# Patient Record
Sex: FEMALE | Race: WHITE | NOT HISPANIC OR LATINO
[De-identification: names, ages, dates, MRNs, and addresses within clinical notes are randomized per-mention and may not be internally consistent; named-entity substitution may affect disease eponyms.]

---

## 2021-04-13 PROBLEM — Z00.00 ENCOUNTER FOR PREVENTIVE HEALTH EXAMINATION: Status: ACTIVE | Noted: 2021-04-13

## 2021-04-15 ENCOUNTER — APPOINTMENT (OUTPATIENT)
Dept: OBGYN | Facility: CLINIC | Age: 81
End: 2021-04-15
Payer: MEDICARE

## 2021-04-15 VITALS — WEIGHT: 145 LBS | HEIGHT: 58 IN | TEMPERATURE: 97 F | BODY MASS INDEX: 30.44 KG/M2

## 2021-04-15 DIAGNOSIS — N95.1 MENOPAUSAL AND FEMALE CLIMACTERIC STATES: ICD-10-CM

## 2021-04-15 DIAGNOSIS — Z01.411 ENCOUNTER FOR GYNECOLOGICAL EXAMINATION (GENERAL) (ROUTINE) WITH ABNORMAL FINDINGS: ICD-10-CM

## 2021-04-15 DIAGNOSIS — L40.9 PSORIASIS, UNSPECIFIED: ICD-10-CM

## 2021-04-15 DIAGNOSIS — L29.2 PRURITUS VULVAE: ICD-10-CM

## 2021-04-15 PROCEDURE — 99387 INIT PM E/M NEW PAT 65+ YRS: CPT

## 2021-04-15 PROCEDURE — 81003 URINALYSIS AUTO W/O SCOPE: CPT | Mod: QW

## 2021-04-15 PROCEDURE — 99072 ADDL SUPL MATRL&STAF TM PHE: CPT

## 2021-04-15 RX ORDER — CLOTRIMAZOLE AND BETAMETHASONE DIPROPIONATE 10; .5 MG/G; MG/G
1-0.05 CREAM TOPICAL 3 TIMES DAILY
Qty: 1 | Refills: 0 | Status: ACTIVE | COMMUNITY
Start: 2021-04-15 | End: 1900-01-01

## 2021-04-17 LAB
BILIRUB UR QL STRIP: NORMAL
CLARITY UR: CLEAR
GLUCOSE UR-MCNC: NORMAL
HCG UR QL: NORMAL EU/DL
HGB UR QL STRIP.AUTO: NORMAL
KETONES UR-MCNC: NORMAL
LEUKOCYTE ESTERASE UR QL STRIP: NORMAL
NITRITE UR QL STRIP: NORMAL
PH UR STRIP: 6
PROT UR STRIP-MCNC: NORMAL
SP GR UR STRIP: 1.01

## 2021-04-27 LAB — CYTOLOGY CVX/VAG DOC THIN PREP: ABNORMAL

## 2022-08-10 ENCOUNTER — INPATIENT (INPATIENT)
Facility: HOSPITAL | Age: 82
LOS: 3 days | Discharge: HOME HEALTH | End: 2022-08-14
Attending: INTERNAL MEDICINE | Admitting: INTERNAL MEDICINE

## 2022-08-10 VITALS
DIASTOLIC BLOOD PRESSURE: 79 MMHG | RESPIRATION RATE: 23 BRPM | SYSTOLIC BLOOD PRESSURE: 155 MMHG | OXYGEN SATURATION: 99 % | TEMPERATURE: 96 F | HEART RATE: 85 BPM

## 2022-08-10 DIAGNOSIS — I47.2 VENTRICULAR TACHYCARDIA: ICD-10-CM

## 2022-08-10 LAB
ALBUMIN SERPL ELPH-MCNC: 4.1 G/DL — SIGNIFICANT CHANGE UP (ref 3.5–5.2)
ALP SERPL-CCNC: 163 U/L — HIGH (ref 30–115)
ALT FLD-CCNC: 20 U/L — SIGNIFICANT CHANGE UP (ref 0–41)
ANION GAP SERPL CALC-SCNC: 14 MMOL/L — SIGNIFICANT CHANGE UP (ref 7–14)
APTT BLD: 38.5 SEC — SIGNIFICANT CHANGE UP (ref 27–39.2)
AST SERPL-CCNC: 34 U/L — SIGNIFICANT CHANGE UP (ref 0–41)
BASOPHILS # BLD AUTO: 0.1 K/UL — SIGNIFICANT CHANGE UP (ref 0–0.2)
BASOPHILS NFR BLD AUTO: 0.6 % — SIGNIFICANT CHANGE UP (ref 0–1)
BILIRUB SERPL-MCNC: 0.4 MG/DL — SIGNIFICANT CHANGE UP (ref 0.2–1.2)
BLD GP AB SCN SERPL QL: SIGNIFICANT CHANGE UP
BUN SERPL-MCNC: 21 MG/DL — HIGH (ref 10–20)
CALCIUM SERPL-MCNC: 10 MG/DL — SIGNIFICANT CHANGE UP (ref 8.5–10.1)
CHLORIDE SERPL-SCNC: 98 MMOL/L — SIGNIFICANT CHANGE UP (ref 98–110)
CO2 SERPL-SCNC: 24 MMOL/L — SIGNIFICANT CHANGE UP (ref 17–32)
CREAT SERPL-MCNC: 0.8 MG/DL — SIGNIFICANT CHANGE UP (ref 0.7–1.5)
EGFR: 74 ML/MIN/1.73M2 — SIGNIFICANT CHANGE UP
EOSINOPHIL # BLD AUTO: 0.06 K/UL — SIGNIFICANT CHANGE UP (ref 0–0.7)
EOSINOPHIL NFR BLD AUTO: 0.4 % — SIGNIFICANT CHANGE UP (ref 0–8)
GLUCOSE SERPL-MCNC: 145 MG/DL — HIGH (ref 70–99)
HCT VFR BLD CALC: 41.2 % — SIGNIFICANT CHANGE UP (ref 37–47)
HGB BLD-MCNC: 13.5 G/DL — SIGNIFICANT CHANGE UP (ref 12–16)
IMM GRANULOCYTES NFR BLD AUTO: 0.4 % — HIGH (ref 0.1–0.3)
INR BLD: 0.95 RATIO — SIGNIFICANT CHANGE UP (ref 0.65–1.3)
LYMPHOCYTES # BLD AUTO: 1.06 K/UL — LOW (ref 1.2–3.4)
LYMPHOCYTES # BLD AUTO: 6.5 % — LOW (ref 20.5–51.1)
MCHC RBC-ENTMCNC: 28.1 PG — SIGNIFICANT CHANGE UP (ref 27–31)
MCHC RBC-ENTMCNC: 32.8 G/DL — SIGNIFICANT CHANGE UP (ref 32–37)
MCV RBC AUTO: 85.7 FL — SIGNIFICANT CHANGE UP (ref 81–99)
MONOCYTES # BLD AUTO: 0.77 K/UL — HIGH (ref 0.1–0.6)
MONOCYTES NFR BLD AUTO: 4.7 % — SIGNIFICANT CHANGE UP (ref 1.7–9.3)
NEUTROPHILS # BLD AUTO: 14.38 K/UL — HIGH (ref 1.4–6.5)
NEUTROPHILS NFR BLD AUTO: 87.4 % — HIGH (ref 42.2–75.2)
NRBC # BLD: 0 /100 WBCS — SIGNIFICANT CHANGE UP (ref 0–0)
PLATELET # BLD AUTO: 353 K/UL — SIGNIFICANT CHANGE UP (ref 130–400)
POTASSIUM SERPL-MCNC: 4.5 MMOL/L — SIGNIFICANT CHANGE UP (ref 3.5–5)
POTASSIUM SERPL-SCNC: 4.5 MMOL/L — SIGNIFICANT CHANGE UP (ref 3.5–5)
PROT SERPL-MCNC: 6.8 G/DL — SIGNIFICANT CHANGE UP (ref 6–8)
PROTHROM AB SERPL-ACNC: 10.9 SEC — SIGNIFICANT CHANGE UP (ref 9.95–12.87)
RBC # BLD: 4.81 M/UL — SIGNIFICANT CHANGE UP (ref 4.2–5.4)
RBC # FLD: 12.8 % — SIGNIFICANT CHANGE UP (ref 11.5–14.5)
SARS-COV-2 RNA SPEC QL NAA+PROBE: SIGNIFICANT CHANGE UP
SODIUM SERPL-SCNC: 136 MMOL/L — SIGNIFICANT CHANGE UP (ref 135–146)
TROPONIN T SERPL-MCNC: 0.89 NG/ML — CRITICAL HIGH
WBC # BLD: 16.43 K/UL — HIGH (ref 4.8–10.8)
WBC # FLD AUTO: 16.43 K/UL — HIGH (ref 4.8–10.8)

## 2022-08-10 PROCEDURE — 99291 CRITICAL CARE FIRST HOUR: CPT

## 2022-08-10 PROCEDURE — 93458 L HRT ARTERY/VENTRICLE ANGIO: CPT | Mod: 26,XU

## 2022-08-10 PROCEDURE — 71045 X-RAY EXAM CHEST 1 VIEW: CPT | Mod: 26

## 2022-08-10 PROCEDURE — 93010 ELECTROCARDIOGRAM REPORT: CPT

## 2022-08-10 PROCEDURE — 92941 PRQ TRLML REVSC TOT OCCL AMI: CPT | Mod: RC

## 2022-08-10 PROCEDURE — 93306 TTE W/DOPPLER COMPLETE: CPT | Mod: 26

## 2022-08-10 RX ORDER — CHLORHEXIDINE GLUCONATE 213 G/1000ML
1 SOLUTION TOPICAL
Refills: 0 | Status: DISCONTINUED | OUTPATIENT
Start: 2022-08-10 | End: 2022-08-14

## 2022-08-10 RX ORDER — NITROGLYCERIN 6.5 MG
10 CAPSULE, EXTENDED RELEASE ORAL
Qty: 50 | Refills: 0 | Status: DISCONTINUED | OUTPATIENT
Start: 2022-08-10 | End: 2022-08-10

## 2022-08-10 RX ORDER — TICAGRELOR 90 MG/1
180 TABLET ORAL ONCE
Refills: 0 | Status: COMPLETED | OUTPATIENT
Start: 2022-08-10 | End: 2022-08-10

## 2022-08-10 RX ORDER — ENOXAPARIN SODIUM 100 MG/ML
40 INJECTION SUBCUTANEOUS EVERY 24 HOURS
Refills: 0 | Status: DISCONTINUED | OUTPATIENT
Start: 2022-08-11 | End: 2022-08-11

## 2022-08-10 RX ORDER — CLOPIDOGREL BISULFATE 75 MG/1
75 TABLET, FILM COATED ORAL DAILY
Refills: 0 | Status: DISCONTINUED | OUTPATIENT
Start: 2022-08-10 | End: 2022-08-10

## 2022-08-10 RX ORDER — NITROGLYCERIN 6.5 MG
10 CAPSULE, EXTENDED RELEASE ORAL
Qty: 50 | Refills: 0 | Status: DISCONTINUED | OUTPATIENT
Start: 2022-08-10 | End: 2022-08-11

## 2022-08-10 RX ORDER — SODIUM CHLORIDE 9 MG/ML
1000 INJECTION INTRAMUSCULAR; INTRAVENOUS; SUBCUTANEOUS
Refills: 0 | Status: DISCONTINUED | OUTPATIENT
Start: 2022-08-10 | End: 2022-08-11

## 2022-08-10 RX ORDER — ATORVASTATIN CALCIUM 80 MG/1
80 TABLET, FILM COATED ORAL AT BEDTIME
Refills: 0 | Status: DISCONTINUED | OUTPATIENT
Start: 2022-08-10 | End: 2022-08-14

## 2022-08-10 RX ORDER — LEVOTHYROXINE SODIUM 125 MCG
100 TABLET ORAL DAILY
Refills: 0 | Status: DISCONTINUED | OUTPATIENT
Start: 2022-08-10 | End: 2022-08-14

## 2022-08-10 RX ORDER — TICAGRELOR 90 MG/1
90 TABLET ORAL EVERY 12 HOURS
Refills: 0 | Status: DISCONTINUED | OUTPATIENT
Start: 2022-08-11 | End: 2022-08-14

## 2022-08-10 RX ORDER — ASPIRIN/CALCIUM CARB/MAGNESIUM 324 MG
81 TABLET ORAL DAILY
Refills: 0 | Status: DISCONTINUED | OUTPATIENT
Start: 2022-08-10 | End: 2022-08-14

## 2022-08-10 RX ADMIN — ATORVASTATIN CALCIUM 80 MILLIGRAM(S): 80 TABLET, FILM COATED ORAL at 21:39

## 2022-08-10 RX ADMIN — TICAGRELOR 180 MILLIGRAM(S): 90 TABLET ORAL at 15:02

## 2022-08-10 NOTE — ED PROVIDER NOTE - OBJECTIVE STATEMENT
82 year old female, past medical history htn, hld, cad, who presents with chest pain. patient with progressively worsening central, non-radiating chest pain described as pressure that began this morning, prompting call to EMS. en route EKG inferior STEMI, nitro given with moderate improvement of symptoms, asymptomatic in ed. hx similar chest pain x1 week ago, self resolved. last cath 2019.

## 2022-08-10 NOTE — ED ADULT NURSE NOTE - CCCP TRG CHIEF CMPLNT
oriented to person, place and time , normal sensation , short and long term memory intact chest pain

## 2022-08-10 NOTE — CHART NOTE - NSCHARTNOTEFT_GEN_A_CORE
PRE-OP DIAGNOSIS:    STEMI. AUC 9    PROCEDURE:     [x] Coronary Angiogram     [x] LHC     [] LVG     [] RHC     [] Intervention (see below)         PHYSICIAN:  Dr Gomez    ASSISTANT:  Dr. CLAUDIA Carey       PROCEDURE DESCRIPTION:     Consent:      [x] Patient     [] Family Member     []  Used        Anesthesia:     [] General     [x] Sedation     [x] Local        Access & Closure:     [] Fr Radial Artery     [x] 6 Fr Right Femoral Artery (Per close)    [] Fr Femoral Vein     [] Fr Brachial Vein       IV Contrast: 120 mL        Intervention: s/p balloon angioplasty of 100% RPL lesion      Implants: None       FINDINGS:     Coronary Dominance: Right      LM: Mild luminal irregularities    LAD: Prox LAD 99% stenosis at the site of origin of D1. Distal LAD mild disease.  D1 severe disease with 70% stenosis.     CX: Distal Cx small vessel mild with  luminal irregularities.  OM1 mild disease.    RCA: Distal RCA mild disease.   % occluded culprit lesion for pt presentation s/p balloon angioplasty.      LVEDP: 36 mmHg      ESTIMATED BLOOD LOSS: < 10 mL        CONDITION:     [x] Good     [] Fair     [] Critical        SPECIMEN REMOVED: N/A       POST-OP DIAGNOSIS:      [x] 2 Vessel Coronary Artery Disease: (RCA;RPL s/p balloon angioplasty and LAD)        PLAN OF CARE:     [x] Admit to CCU.     [x] Return for Staged Procedure on 8/12/22    [x] Medications: Aspirin, Plavix, lipitor 80 and nitro drip for now.  Check  lipid profile, HbA1c and 2D echo with lumison contrast stat.     [x] IV Fluids: NS @ 75 cc/hr for 6 hours PRE-OP DIAGNOSIS:    STEMI. AUC 9    PROCEDURE:     [x] Coronary Angiogram     [x] LHC     [] LVG     [] RHC     [] Intervention (see below)         PHYSICIAN:  Dr Gomez    ASSISTANT:  Dr. CLAUDIA Carey       PROCEDURE DESCRIPTION:     Consent:      [x] Patient     [] Family Member     []  Used        Anesthesia:     [] General     [x] Sedation     [x] Local        Access & Closure:     [] Fr Radial Artery     [x] 6 Fr Right Femoral Artery (Per close)    [] Fr Femoral Vein     [] Fr Brachial Vein       IV Contrast: 120 mL        Intervention: s/p balloon angioplasty of 100% RPL lesion      Implants: None       FINDINGS:     Coronary Dominance: Right      LM: Mild luminal irregularities    LAD: Prox LAD 99% stenosis at the site of origin of D1. Distal LAD mild disease.  D1 severe disease with 70% stenosis.     CX: Distal Cx small vessel mild with  luminal irregularities.  OM1 mild disease.    RCA: Distal RCA mild disease.   % occluded culprit lesion for pt presentation s/p balloon angioplasty.      LVEDP: 36 mmHg      ESTIMATED BLOOD LOSS: < 10 mL        CONDITION:     [x] Good     [] Fair     [] Critical        SPECIMEN REMOVED: N/A       POST-OP DIAGNOSIS:      [x] 2 Vessel Coronary Artery Disease: (RCA;RPL s/p balloon angioplasty and LAD)        PLAN OF CARE:     [x] Admit to CCU.     [x] Return for Staged Procedure on 8/12/22    [x] Medications: Aspirin, Plavix, lipitor 80 and nitro drip for now. No BB or AV jes blocking drugs for 24 hours (pt had transient complete heart block).   Check  lipid profile, HbA1c and 2D echo with lumison contrast stat.     [x] IV Fluids: NS @ 75 cc/hr for 6 hours PRE-OP DIAGNOSIS:    STEMI. AUC 9    PROCEDURE:     [x] Coronary Angiogram     [x] LHC     [] LVG     [] RHC     [] Intervention (see below)         PHYSICIAN:  Dr Gomez    ASSISTANT:  Dr. CLAUDIA Carey       PROCEDURE DESCRIPTION:     Consent:      [x] Patient     [] Family Member     []  Used        Anesthesia:     [] General     [x] Sedation     [x] Local        Access & Closure:     [] Fr Radial Artery     [x] 6 Fr Right Femoral Artery (Per close)    [] Fr Femoral Vein     [] Fr Brachial Vein       IV Contrast: 120 mL        Intervention: s/p balloon angioplasty of 100% RPL lesion      Implants: None       FINDINGS:     Coronary Dominance: Right      LM: Mild luminal irregularities    LAD: Prox LAD 99% stenosis at the site of origin of D1. Distal LAD mild disease.  D1 severe disease with 70% stenosis.     CX: Distal Cx small vessel mild with  luminal irregularities.  OM1 mild disease.    RCA: Distal RCA mild disease.   % occluded culprit lesion for pt presentation s/p balloon angioplasty.      LVEDP: 36 mmHg      ESTIMATED BLOOD LOSS: < 10 mL        CONDITION:     [x] Good     [] Fair     [] Critical        SPECIMEN REMOVED: N/A       POST-OP DIAGNOSIS:      [x] 2 Vessel Coronary Artery Disease: (RCA;RPL s/p balloon angioplasty and LAD)        PLAN OF CARE:     [x] Admit to CCU.     [x] Return for Staged Procedure on 8/12/22    [x] Medications: Aspirin, brillinta, lipitor 80 and nitro drip for now. No BB or AV jes blocking drugs for 24 hours (pt had transient complete heart block).   Check  lipid profile, HbA1c and 2D echo with lumison contrast stat.     [x] IV Fluids: NS @ 75 cc/hr for 6 hours PRE-OP DIAGNOSIS:    STEMI. AUC 9    PROCEDURE:     [x] Coronary Angiogram     [x] LHC     [] LVG     [] RHC     [] Intervention (see below)         PHYSICIAN:  Dr Gomez    ASSISTANT:  Dr. CLAUDIA Carey       PROCEDURE DESCRIPTION:     Consent:      [x] Patient     [] Family Member     []  Used        Anesthesia:     [] General     [x] Sedation     [x] Local        Access & Closure:     [] Fr Radial Artery     [x] 6 Fr Right Femoral Artery (Per close)    [] Fr Femoral Vein     [] Fr Brachial Vein       IV Contrast: 120 mL        Intervention: s/p balloon angioplasty of 100% RPL lesion      Implants: None       FINDINGS:     Coronary Dominance: Right      LM: Mild luminal irregularities    LAD: Prox LAD 95% stenosis at the site of origin of D1. Distal LAD mild disease.  D1 severe disease with 70% stenosis.     CX: Distal Cx small vessel mild with  luminal irregularities.  OM1 mild disease.    RCA: Distal RCA mild disease.   % occluded s/p balloon angioplasty.      LVEDP: 36 mmHg      ESTIMATED BLOOD LOSS: < 10 mL        CONDITION:     [x] Good     [] Fair     [] Critical        SPECIMEN REMOVED: N/A       POST-OP DIAGNOSIS:      [x] 2 Vessel Coronary Artery Disease: (RCA;RPL and LAD)        PLAN OF CARE:     [x] Admit to CCU.     [x] Return for Staged Procedure on 8/12/22    [x] Medications: Aspirin, brillinta, lipitor 80 and nitro drip for now. No BB or AV jes blocking drugs for 24 hours (pt had transient complete heart block).   Check  lipid profile, HbA1c and 2D echo with lumison contrast stat.     [x] IV Fluids: NS @ 75 cc/hr for 6 hours

## 2022-08-10 NOTE — PATIENT PROFILE ADULT - FALL HARM RISK - RISK INTERVENTIONS
Assistance OOB with selected safe patient handling equipment/Assistance with ambulation/Communicate Fall Risk and Risk Factors to all staff, patient, and family/Monitor gait and stability/Reinforce activity limits and safety measures with patient and family/Sit up slowly, dangle for a short time, stand at bedside before walking/Use of alarms - bed, chair and/or voice tab/Visual Cue: Yellow wristband/Bed in lowest position, wheels locked, appropriate side rails in place/Call bell, personal items and telephone in reach/Instruct patient to call for assistance before getting out of bed or chair/Non-slip footwear when patient is out of bed/[place] to call system/Physically safe environment - no spills, clutter or unnecessary equipment/Purposeful Proactive Rounding/Room/bathroom lighting operational, light cord in reach

## 2022-08-10 NOTE — H&P ADULT - NSHPREVIEWOFSYSTEMS_GEN_ALL_CORE
REVIEW OF SYSTEMS:    CONSTITUTIONAL: No weakness, fevers or chills  EYES/ENT: No visual changes;  No vertigo or throat pain   NECK: No pain or stiffness  RESPIRATORY: No cough, wheezing, hemoptysis; No shortness of breath  CARDIOVASCULAR: Chest pain  GASTROINTESTINAL: nausea and vomiting  GENITOURINARY: No dysuria, frequency or hematuria  NEUROLOGICAL: No numbness or weakness  SKIN: No itching, rashes REVIEW OF SYSTEMS:      CONSTITUTIONAL: No weakness, fevers or chills  EYES/ENT: No visual changes;  No vertigo or throat pain   NECK: No pain or stiffness  RESPIRATORY: No cough, wheezing, hemoptysis; No shortness of breath  CARDIOVASCULAR: Chest pain  GASTROINTESTINAL: nausea and vomiting  GENITOURINARY: No dysuria, frequency or hematuria  NEUROLOGICAL: No numbness or weakness  SKIN: No itching, rashes

## 2022-08-10 NOTE — ED PROVIDER NOTE - PHYSICAL EXAMINATION
CONSTITUTIONAL: Well-developed; well-nourished; in no acute distress, nontoxic appearing  SKIN: skin exam is warm and dry  ENT: MMM  CARD: S1, S2 normal, no murmur  RESP: No wheezes, rales or rhonchi. Good air movement bilaterally  ABD: soft; non-distended; non-tender.   EXT: Normal ROM.   NEURO: awake, alert, following commands, oriented, grossly unremarkable. No Focal deficits. GCS 15.   PSYCH: Cooperative, appropriate.

## 2022-08-10 NOTE — H&P ADULT - HISTORY OF PRESENT ILLNESS
This is a case of an 82 year old lady known to have HTN, dyslipidemia, CAD, presented to the ED with chest pain of ~1-2 hours duration. Patient reports a progressively worsening central, non-radiating chest pain described as pressure that began around 10 AM, prompting call to EMS. Chest pain was associated with nausea and 2 episodes of NBNB vomiting as well as diaphoresis.   En route to the hospital, an EKG showed inferior STEMI, nitro given with moderate improvement of symptoms. Patient was asymptomatic in ED. Off note, patient reports that she had 2 episodes of chest pain on Sunday and Monday prompting her to schedule an appointment with Dr Powers on next Monday.     In the ED, code STEMI was called. She was loaded with ASA and brilinta and transfered to cath lab.    Cath showed:   Right dominant circulation.  LM: Mild luminal irregularities  LAD: Prox LAD 99% stenosis at the site of origin of D1. Distal LAD mild disease.  D1 severe disease with 70% stenosis.   CX: Distal Cx small vessel mild with  luminal irregularities.  OM1 mild disease.  RCA: Distal RCA mild disease.   % occluded culprit lesion for pt presentation s/p balloon angioplasty.    Patient is planned for staged procedure on Friday 8/12/22. Admit to CCU This is a case of an 82 year old lady known to have HTN, dyslipidemia, CAD, and hypothyroidism, presented to the ED with chest pain of ~1-2 hours duration. Patient reports a progressively worsening central, non-radiating chest pain described as pressure that began around 10 AM, prompting call to EMS. Chest pain was associated with nausea and 2 episodes of NBNB vomiting as well as diaphoresis.   En route to the hospital, an EKG showed inferior STEMI, nitro given with moderate improvement of symptoms. Patient was asymptomatic in ED. Off note, patient reports that she had 2 episodes of chest pain on Sunday and Monday prompting her to schedule an appointment with Dr Powers on next Monday.     In the ED, code STEMI was called. She was loaded with ASA and brilinta and transfered to cath lab.    Cath showed:   Right dominant circulation.  LM: Mild luminal irregularities  LAD: Prox LAD 99% stenosis at the site of origin of D1. Distal LAD mild disease.  D1 severe disease with 70% stenosis.   CX: Distal Cx small vessel mild with  luminal irregularities.  OM1 mild disease.  RCA: Distal RCA mild disease.   % occluded culprit lesion for pt presentation s/p balloon angioplasty.    Patient is planned for staged PCI to LAD on Friday 8/12/22. Admit to CCU This is a case of an 82 year old lady known to have HTN, dyslipidemia, CAD, and hypothyroidism, presented to the ED with chest pain of ~1-2 hours duration. Patient reports a progressively worsening central, non-radiating chest pain described as pressure that began around 10 AM, prompting call to EMS. Chest pain was associated with nausea and 2 episodes of NBNB vomiting as well as diaphoresis.   En route to the hospital, an EKG showed inferior STEMI, nitro given with moderate improvement of symptoms. Patient was asymptomatic in ED. Off note, patient reports that she had 2 episodes of chest pain on Sunday and Monday prompting her to schedule an appointment with Dr Powers on next Monday.       In the ED, code STEMI was called. She was loaded with ASA and brilinta and transfered to cath lab.    Cath showed:   Right dominant circulation.  LM: Mild luminal irregularities  LAD: Prox LAD 99% stenosis at the site of origin of D1. Distal LAD mild disease.  D1 severe disease with 70% stenosis.   CX: Distal Cx small vessel mild with  luminal irregularities.  OM1 mild disease.  RCA: Distal RCA mild disease.   % occluded culprit lesion for pt presentation s/p balloon angioplasty.    Patient is planned for staged PCI to LAD on Friday 8/12/22. Admit to CCU

## 2022-08-10 NOTE — H&P ADULT - NSHPPHYSICALEXAM_GEN_ALL_CORE
GENERAL:   (X) NAD, lying in bed comfortably     (  ) obtunded     (  ) lethargic     (  ) somnolent    HEAD:   (X) Atraumatic     (  ) hematoma     (  ) laceration (specify location:       )     NECK:  (X) Supple     (  ) neck stiffness     (  ) nuchal rigidity     (  )  no JVD     (  ) JVD present ( -- cm)    HEART:  Rate -->     (X) normal rate     (  ) bradycardic     (  ) tachycardic  Rhythm -->     (X) regular     (  ) regularly irregular     (  ) irregularly irregular  Murmurs -->     (X) normal s1s2     (  ) systolic murmur     (  ) diastolic murmur     (  ) continuous murmur      (  ) S3 present     (  ) S4 present    LUNGS:   (X)Unlabored respirations     (  ) tachypnea  (X) B/L air entry     (  ) decreased breath sounds in:  (location     )    (X) no adventitious sound     (  ) crackles     (  ) wheezing      (  ) rhonchi      (specify location:       )  (  ) chest wall tenderness (specify location:       )    ABDOMEN:   (X) Soft     (  ) tense   |   (  ) nondistended     (  ) distended   |   (X) +BS     (  ) hypoactive bowel sounds     (  ) hyperactive bowel sounds  (X) nontender     (  ) RUQ tenderness     (  ) RLQ tenderness     (  ) LLQ tenderness     (  ) epigastric tenderness     (  ) diffuse tenderness  (  ) Splenomegaly      (  ) Hepatomegaly      (  ) Jaundice     (  ) ecchymosis     EXTREMITIES: 2+ peripheral pulses bilaterally. No clubbing, cyanosis, or edema  (X) Normal     (  ) Rash     (  ) ecchymosis     (  ) varicose veins      (  ) pitting edema     (  ) non-pitting edema   (  ) ulceration     (  ) gangrene:     (location:     )    NERVOUS SYSTEM:    (X) A&Ox3     (  ) confused     (  ) lethargic  CN II-XII:     (X) Intact     (  ) deficits found     (Specify:     )   Upper extremities:     (X) no sensorimotor deficits     (  ) weakness     (  ) loss of proprioception/vibration     (  ) loss of touch/temperature (specify:    )  Lower extremities:     (X) no sensorimotor deficits     (  ) weakness     (  ) loss of proprioception/vibration     (  ) loss of touch/temperature (specify:    )    SKIN:   (X) No rashes or lesions     (  ) maculopapular rash     (  ) pustules     (  ) vesicles     (  ) ulcer     (  ) ecchymosis     (specify location:     ) GENERAL:   (X) NAD, lying in bed comfortably     (  ) obtunded     (  ) lethargic     (  ) somnolent    HEAD:   (X) Atraumatic     (  ) hematoma     (  ) laceration (specify location:       )     NECK:  (X) Supple     (  ) neck stiffness     (  ) nuchal rigidity     (  )  no JVD     (  ) JVD present ( -- cm)    HEART:  Rate -->     (X) normal rate     (  ) bradycardic     (  ) tachycardic  Rhythm -->     (X) regular     (  ) regularly irregular     (  ) irregularly irregular  Murmurs -->     (X) normal s1s2     (  ) systolic murmur     (  ) diastolic murmur     (  ) continuous murmur      (  ) S3 present     (  ) S4 present    LUNGS:   (X)Unlabored respirations     (  ) tachypnea  (X) B/L air entry     (  ) decreased breath sounds in:  (location     )    (X) no adventitious sound     (  ) crackles     (  ) wheezing      (  ) rhonchi      (specify location:       )  (  ) chest wall tenderness (specify location:       )    ABDOMEN:   (X) Soft     (  ) tense   |   (  ) nondistended     (  ) distended   |   (X) +BS     (  ) hypoactive bowel sounds     (  ) hyperactive bowel sounds  (X) nontender     (  ) RUQ tenderness     (  ) RLQ tenderness     (  ) LLQ tenderness     (  ) epigastric tenderness     (  ) diffuse tenderness  (  ) Splenomegaly      (  ) Hepatomegaly      (  ) Jaundice     (  ) ecchymosis     EXTREMITIES: 2+ peripheral pulses bilaterally. No clubbing, cyanosis, or edema  (X) Normal     (  ) Rash     (  ) ecchymosis     (  ) varicose veins      (  ) pitting edema     (  ) non-pitting edema   (  ) ulceration     (  ) gangrene:     (location:     )  CATH SITE: clean dressing. no site of bleed    NERVOUS SYSTEM:    (X) A&Ox3     (  ) confused     (  ) lethargic  CN II-XII:     (X) Intact     (  ) deficits found     (Specify:     )   Upper extremities:     (X) no sensorimotor deficits     (  ) weakness     (  ) loss of proprioception/vibration     (  ) loss of touch/temperature (specify:    )  Lower extremities:     (X) no sensorimotor deficits     (  ) weakness     (  ) loss of proprioception/vibration     (  ) loss of touch/temperature (specify:    )    SKIN:   (X) No rashes or lesions     (  ) maculopapular rash     (  ) pustules     (  ) vesicles     (  ) ulcer     (  ) ecchymosis     (specify location:     )

## 2022-08-10 NOTE — PATIENT PROFILE ADULT - FUNCTIONAL ASSESSMENT - BASIC MOBILITY 2.
Addended by: MARIBEL GARCIA on: 11/20/2019 09:14 AM     Modules accepted: Orders     Addended by: ROB GARCÍA IV on: 11/20/2019 08:53 AM     Modules accepted: Orders     3 = A little assistance Pt in NAD distress. Pt is asymptomatic. Pt resting in bed

## 2022-08-10 NOTE — CONSULT NOTE ADULT - SUBJECTIVE AND OBJECTIVE BOX
HPI:  82 Yr F PMH HTN, HLD, hypothyroidism and non-obstructive CAD presenting with chest pain. Pain is L sided crushing since 10am. Had similar episode 1 week prior.  On presentation STEMI code called. I responded immediatly. EKG shows ST elevation inferiorlateral leads.    PAST MEDICAL & SURGICAL HISTORY  HTN, HLD, Hypothyroidism and non-obstructive CAD    FAMILY HISTORY:  FAMILY HISTORY:  No significant family history    SOCIAL HISTORY:  []smoker  []Alcohol  []Drug    ALLERGIES:  No Known Allergies      MEDICATIONS:  MEDICATIONS  (STANDING):  aspirin  chewable 81 milliGRAM(s) Chew daily  atorvastatin 80 milliGRAM(s) Oral at bedtime  clopidogrel Tablet 75 milliGRAM(s) Oral daily  nitroglycerin  Infusion 10 MICROgram(s)/Min (3 mL/Hr) IV Continuous <Continuous>  sodium chloride 0.9%. 1000 milliLiter(s) (75 mL/Hr) IV Continuous <Continuous>    MEDICATIONS  (PRN):      HOME MEDICATIONS:  Home Medications:      VITALS:   T(F): 95.9 (08-10 @ 16:00), Max: 95.9 (08-10 @ 16:00)  HR: 92 (08-10 @ 16:15) (85 - 92)  BP: 123/70 (08-10 @ 16:15) (123/70 - 155/79)  BP(mean): 91 (08-10 @ 16:15) (91 - 107)  RR: 29 (08-10 @ 16:15) (23 - 29)  SpO2: 97% (08-10 @ 16:15) (97% - 99%)    I&O's Summary      REVIEW OF SYSTEMS:  CONSTITUTIONAL: No weakness, fevers or chills  EYES: No visual changes  ENT: No vertigo or throat pain   NECK: No pain or stiffness  RESPIRATORY: No cough, wheezing, hemoptysis; No shortness of breath  CARDIOVASCULAR: Chest pain  GASTROINTESTINAL: No abdominal or epigastric pain. No nausea, vomiting, or hematemesis; No diarrhea or constipation. No melena or hematochezia.  GENITOURINARY: No dysuria, frequency or hematuria  NEUROLOGICAL: No numbness or weakness  SKIN: No itching, no rashes  MSK: no    PHYSICAL EXAM:  NEURO: patient is awake , alert and oriented  GEN: Not in acute distress  NECK: no thyroid enlargement, no JVD  LUNGS: Clear to auscultation bilaterally   CARDIOVASCULAR: S1/S2 present, RRR , no murmurs or rubs, no carotid bruits,  + PP bilaterally  ABD: Soft, non-tender, non-distended, +BS  EXT: No PRISCILLA  SKIN: Intact    LABS:                        13.5   16.43 )-----------( 353      ( 10 Aug 2022 14:17 )             41.2     08-10    136  |  98  |  21<H>  ----------------------------<  145<H>  4.5   |  24  |  0.8    Ca    10.0      10 Aug 2022 14:17    TPro  6.8  /  Alb  4.1  /  TBili  0.4  /  DBili  x   /  AST  34  /  ALT  20  /  AlkPhos  163<H>  08-10    PT/INR - ( 10 Aug 2022 14:17 )   PT: 10.90 sec;   INR: 0.95 ratio         PTT - ( 10 Aug 2022 14:17 )  PTT:38.5 sec  Troponin T, Serum: 0.89 ng/mL *HH* (08-10-22 @ 14:17)    CARDIAC MARKERS ( 10 Aug 2022 14:17 )  x     / 0.89 ng/mL / x     / x     / x            Troponin trend:            RADIOLOGY:  -CXR:  -TTE:  -CCTA:  -STRESS TEST:  -CATHETERIZATION:    ECG:  NSR with ST elevation in inferior lateral leads    TELEMETRY EVENTS:

## 2022-08-10 NOTE — ED PROVIDER NOTE - NS ED ROS FT
Review of Systems:  	•	CONSTITUTIONAL: no fever   	•	SKIN: no rash   	•	RESPIRATORY: no shortness of breath   	•	CARDIAC: +chest pain, no palpitations  	•	GI: no abd pain, no nausea, no vomiting   	•	MUSCULOSKELETAL: no joint paint, no swelling, no redness  	•	NEUROLOGIC: no weakness, no headache   	•	PSYCH: no anxiety, non suicidal, non homicidal, no hallucination, no depression

## 2022-08-10 NOTE — H&P ADULT - ASSESSMENT
This is case of an 82 year old female KTH HTN, DL, CAD presenting for chest pain. Found to have STEMI s/p RPL balloon angioplasty. Admitted to CCU pending staged procedure on Friday 8/12/22.    # Chest pain - Resolved  # STEMI  - Patient found to inferior STEMI on EKG  - S/p nitro en route to the hospital  - Loaded with Aspirin and Brilinta in the ED  - Cath showed 2 vessel CAD with an RPDL 100% occlusion s/p balloon angioplasty  - Planned for staged procedure in 48 hours  - C/w DAPT --> ASA/Plavix  - admit to CCU  - 2d echo  - lipitor 80 mg QHS  - hold all B-blocker and AV node blocking agents  - Repeat ECG in AM  - F/u CXR  - Cardiac monitoring  - lipitor 80 mg QHS  - C/w nitro drip and contact cardiac fellow for worsening chest pain  - F/u AM TSH, lipid profile and A1c    # Dyslipidemia  - C/w lipitor 80mg qHS  - F/u AM a1c    # HTN  - Currently on nitro-drip  - Close monitoring for HR and BP    Diet: DASH  Activity: IAT  DVT Prophylaxis: Lovenox 40mg qD  GI Prophylaxis: pantoprazole 40mg qD  CHG Order  Code Status: Full  Disposition: admit to CCU     This is case of an 82 year old female KTH HTN, DL, CAD presenting for chest pain. Found to have STEMI s/p RPL balloon angioplasty. Admitted to CCU pending staged procedure on Friday 8/12/22.    # Chest pain - Resolved  # STEMI  # CAD  - Patient found to inferior STEMI on EKG  - S/p nitro en route to the hospital  - Loaded with Aspirin and Brilinta in the ED  - Cath showed 2 vessel CAD with an RPDL 100% occlusion s/p balloon angioplasty  - Planned for staged procedure in 48 hours  - C/w DAPT --> ASA/brilinta  - admit to CCU  - 2d echo  - lipitor 80 mg QHS  - hold all B-blocker and AV node blocking agents  - Repeat ECG in AM  - F/u CXR  - Cardiac monitoring  - lipitor 80 mg QHS  - C/w nitro drip and contact cardiac fellow for worsening chest pain (target chest pain score 0/10. weaning parameters if no pain for SBP<120mmHg)  - F/u AM TSH, lipid profile and A1c    # Dyslipidemia  - C/w lipitor 80mg qHS  - F/u AM a1c    # HTN  - Currently on nitro-drip  - Close monitoring for HR and BP  - Holding home antihypertensive meds    # Hypothyroidism  - C/w levothyroxine 100mcg qD  - F/u AM TSH    Diet: DASH  Activity: IAT  DVT Prophylaxis: Lovenox 40mg qD  GI Prophylaxis: pantoprazole 40mg qD  CHG Order  Code Status: Full  Disposition: admit to CCU     This is case of an 82 year old female KTH HTN, DL, CAD presenting for chest pain. Found to have STEMI s/p RPL balloon angioplasty. Admitted to CCU pending staged procedure on Friday 8/12/22.    # Chest pain - Resolved  # STEMI  # CAD    - Patient found to inferior STEMI on EKG  - S/p nitro en route to the hospital  - Loaded with Aspirin and Brilinta in the ED  - Cath showed 2 vessel CAD with an RPDL 100% occlusion s/p balloon angioplasty  - Planned for staged procedure in 48 hours  - C/w DAPT --> ASA/brilinta  - admit to CCU  - 2d echo  - lipitor 80 mg QHS  - hold all B-blocker and AV node blocking agents    - Repeat ECG in AM  - F/u CXR  - Cardiac monitoring  - lipitor 80 mg QHS  - C/w nitro drip and contact cardiac fellow for worsening chest pain (target chest pain score 0/10. weaning parameters if no pain for SBP<120mmHg)  - F/u AM TSH, lipid profile and A1c    # Dyslipidemia  - C/w lipitor 80mg qHS  - F/u AM a1c    # HTN  - Currently on nitro-drip  - Close monitoring for HR and BP  - Holding home antihypertensive meds    # Hypothyroidism  - C/w levothyroxine 100mcg qD  - F/u AM TSH    Diet: DASH  Activity: IAT  DVT Prophylaxis: Lovenox 40mg qD  GI Prophylaxis: pantoprazole 40mg qD  CHG Order  Code Status: Full  Disposition: admit to CCU

## 2022-08-10 NOTE — H&P ADULT - NSHPLABSRESULTS_GEN_ALL_CORE
LABS:  cret                        13.5   16.43 )-----------( 353      ( 10 Aug 2022 14:17 )             41.2     08-10    136  |  98  |  21<H>  ----------------------------<  145<H>  4.5   |  24  |  0.8    Ca    10.0      10 Aug 2022 14:17    TPro  6.8  /  Alb  4.1  /  TBili  0.4  /  DBili  x   /  AST  34  /  ALT  20  /  AlkPhos  163<H>  08-10    PT/INR - ( 10 Aug 2022 14:17 )   PT: 10.90 sec;   INR: 0.95 ratio         PTT - ( 10 Aug 2022 14:17 )  PTT:38.5 sec LABS:    cret                        13.5   16.43 )-----------( 353      ( 10 Aug 2022 14:17 )             41.2     08-10    136  |  98  |  21<H>  ----------------------------<  145<H>  4.5   |  24  |  0.8    Ca    10.0      10 Aug 2022 14:17    TPro  6.8  /  Alb  4.1  /  TBili  0.4  /  DBili  x   /  AST  34  /  ALT  20  /  AlkPhos  163<H>  08-10    PT/INR - ( 10 Aug 2022 14:17 )   PT: 10.90 sec;   INR: 0.95 ratio         PTT - ( 10 Aug 2022 14:17 )  PTT:38.5 sec

## 2022-08-10 NOTE — ED PROVIDER NOTE - CLINICAL SUMMARY MEDICAL DECISION MAKING FREE TEXT BOX
Patient presented with sudden onset of chest pain since this AM. On arrival patient afebrile, HD stable, but (+) EKG showed (+) JAZZY concerning for STEMI. Code STEMI called and cardiology evaluated patient at bedside. Ultimately cardio decided they will take patient to cath lab for further management. Patient agreeable with plan. HD stable at time of admission.

## 2022-08-10 NOTE — CONSULT NOTE ADULT - ASSESSMENT
82 Yr F PMH HTN, HLD, hypothyroidism and non-obstructive CAD presenting with chest pain    Chest pain Inferior lateral STEMI  Hx HTN, Non-obstructive CAD    - EKG shows STEMI inferior lateral leads  - Give Aspirin 325mg and Brilinta 180mg  - Atorvastatin 80mg  - Lipid profile, HbA1C, TSH and Echo  - CCU monitoring  - Will take emergently for cardiac angiogram

## 2022-08-10 NOTE — ED ADULT NURSE REASSESSMENT NOTE - NS ED NURSE REASSESS COMMENT FT1
Pt brought to Cath Lab - meds administered and report given by DEREK Merida.  Pt has LAC 18g and LFA 18g.  Pt alert and oriented with no complaints of chest pain at this time.

## 2022-08-10 NOTE — ED PROVIDER NOTE - CRITICAL CARE ATTENDING CONTRIBUTION TO CARE
82 year old female, pmhx as documented presenting with sudden onset of substernal chest pain since this AM described as tight, non-radiating, no palliative or provocative factors, moderate severity. Otherwise denies fevers, dyspnea, cough, N/V/D, blood in stool, urinary symptoms or any other complaints.    Vital Signs: I have reviewed the initial vital signs.  Constitutional: NAD, well-nourished, appears stated age, no acute distress.  HEENT: Airway patent, moist MM, no erythema/swelling/deformity of oral structures. EOMI, PERRLA.  CV: regular rate, regular rhythm, well-perfused extremities, 2+ b/l DP and radial pulses equal.  Lungs: BCTA, no increased WOB.  ABD: NTND, no guarding or rebound, no pulsatile mass, no hernias.   MSK: Neck supple, nontender, nl ROM, no stepoff. Chest nontender. Back nontender in TLS spine or to b/l bony structures or flanks. Ext nontender, nl rom, no deformity.   INTEG: Skin warm, dry, no rash.  NEURO: A&Ox3, normal strength, nl sensation throughout, normal speech.   PSYCH: Calm, cooperative, normal affect and interaction.    EKG shows (+) JAZZY in inferolateral leads concerning for STEMI. Code STEMI called. Will f/u cardio recs, obtain labs, re-eval.

## 2022-08-11 LAB
A1C WITH ESTIMATED AVERAGE GLUCOSE RESULT: 6.1 % — HIGH (ref 4–5.6)
ALBUMIN SERPL ELPH-MCNC: 3.8 G/DL — SIGNIFICANT CHANGE UP (ref 3.5–5.2)
ALP SERPL-CCNC: 156 U/L — HIGH (ref 30–115)
ALT FLD-CCNC: 22 U/L — SIGNIFICANT CHANGE UP (ref 0–41)
ANION GAP SERPL CALC-SCNC: 11 MMOL/L — SIGNIFICANT CHANGE UP (ref 7–14)
APTT BLD: 60.3 SEC — HIGH (ref 27–39.2)
APTT BLD: 68.7 SEC — HIGH (ref 27–39.2)
AST SERPL-CCNC: 57 U/L — HIGH (ref 0–41)
BASOPHILS # BLD AUTO: 0.06 K/UL — SIGNIFICANT CHANGE UP (ref 0–0.2)
BASOPHILS NFR BLD AUTO: 0.4 % — SIGNIFICANT CHANGE UP (ref 0–1)
BILIRUB SERPL-MCNC: 0.9 MG/DL — SIGNIFICANT CHANGE UP (ref 0.2–1.2)
BUN SERPL-MCNC: 15 MG/DL — SIGNIFICANT CHANGE UP (ref 10–20)
CALCIUM SERPL-MCNC: 9.6 MG/DL — SIGNIFICANT CHANGE UP (ref 8.5–10.1)
CHLORIDE SERPL-SCNC: 98 MMOL/L — SIGNIFICANT CHANGE UP (ref 98–110)
CHOLEST SERPL-MCNC: 197 MG/DL — SIGNIFICANT CHANGE UP
CO2 SERPL-SCNC: 29 MMOL/L — SIGNIFICANT CHANGE UP (ref 17–32)
CREAT SERPL-MCNC: 0.7 MG/DL — SIGNIFICANT CHANGE UP (ref 0.7–1.5)
EGFR: 86 ML/MIN/1.73M2 — SIGNIFICANT CHANGE UP
EOSINOPHIL # BLD AUTO: 0.22 K/UL — SIGNIFICANT CHANGE UP (ref 0–0.7)
EOSINOPHIL NFR BLD AUTO: 1.6 % — SIGNIFICANT CHANGE UP (ref 0–8)
ESTIMATED AVERAGE GLUCOSE: 128 MG/DL — HIGH (ref 68–114)
GLUCOSE SERPL-MCNC: 127 MG/DL — HIGH (ref 70–99)
HCT VFR BLD CALC: 39.5 % — SIGNIFICANT CHANGE UP (ref 37–47)
HDLC SERPL-MCNC: 54 MG/DL — SIGNIFICANT CHANGE UP
HGB BLD-MCNC: 13.1 G/DL — SIGNIFICANT CHANGE UP (ref 12–16)
IMM GRANULOCYTES NFR BLD AUTO: 0.4 % — HIGH (ref 0.1–0.3)
LIPID PNL WITH DIRECT LDL SERPL: 119 MG/DL — HIGH
LYMPHOCYTES # BLD AUTO: 1.8 K/UL — SIGNIFICANT CHANGE UP (ref 1.2–3.4)
LYMPHOCYTES # BLD AUTO: 13.5 % — LOW (ref 20.5–51.1)
MAGNESIUM SERPL-MCNC: 1.9 MG/DL — SIGNIFICANT CHANGE UP (ref 1.8–2.4)
MCHC RBC-ENTMCNC: 28 PG — SIGNIFICANT CHANGE UP (ref 27–31)
MCHC RBC-ENTMCNC: 33.2 G/DL — SIGNIFICANT CHANGE UP (ref 32–37)
MCV RBC AUTO: 84.4 FL — SIGNIFICANT CHANGE UP (ref 81–99)
MONOCYTES # BLD AUTO: 1.71 K/UL — HIGH (ref 0.1–0.6)
MONOCYTES NFR BLD AUTO: 12.8 % — HIGH (ref 1.7–9.3)
NEUTROPHILS # BLD AUTO: 9.5 K/UL — HIGH (ref 1.4–6.5)
NEUTROPHILS NFR BLD AUTO: 71.3 % — SIGNIFICANT CHANGE UP (ref 42.2–75.2)
NON HDL CHOLESTEROL: 143 MG/DL — HIGH
NRBC # BLD: 0 /100 WBCS — SIGNIFICANT CHANGE UP (ref 0–0)
PHOSPHATE SERPL-MCNC: 3.6 MG/DL — SIGNIFICANT CHANGE UP (ref 2.1–4.9)
PLATELET # BLD AUTO: 335 K/UL — SIGNIFICANT CHANGE UP (ref 130–400)
POTASSIUM SERPL-MCNC: 3.9 MMOL/L — SIGNIFICANT CHANGE UP (ref 3.5–5)
POTASSIUM SERPL-SCNC: 3.9 MMOL/L — SIGNIFICANT CHANGE UP (ref 3.5–5)
PROT SERPL-MCNC: 6.2 G/DL — SIGNIFICANT CHANGE UP (ref 6–8)
RBC # BLD: 4.68 M/UL — SIGNIFICANT CHANGE UP (ref 4.2–5.4)
RBC # FLD: 12.9 % — SIGNIFICANT CHANGE UP (ref 11.5–14.5)
SODIUM SERPL-SCNC: 138 MMOL/L — SIGNIFICANT CHANGE UP (ref 135–146)
TRIGL SERPL-MCNC: 121 MG/DL — SIGNIFICANT CHANGE UP
TSH SERPL-MCNC: 1.12 UIU/ML — SIGNIFICANT CHANGE UP (ref 0.27–4.2)
WBC # BLD: 13.34 K/UL — HIGH (ref 4.8–10.8)
WBC # FLD AUTO: 13.34 K/UL — HIGH (ref 4.8–10.8)

## 2022-08-11 PROCEDURE — 93010 ELECTROCARDIOGRAM REPORT: CPT

## 2022-08-11 PROCEDURE — 99291 CRITICAL CARE FIRST HOUR: CPT

## 2022-08-11 RX ORDER — MAGNESIUM SULFATE 500 MG/ML
2 VIAL (ML) INJECTION ONCE
Refills: 0 | Status: COMPLETED | OUTPATIENT
Start: 2022-08-11 | End: 2022-08-11

## 2022-08-11 RX ORDER — POTASSIUM CHLORIDE 20 MEQ
40 PACKET (EA) ORAL ONCE
Refills: 0 | Status: COMPLETED | OUTPATIENT
Start: 2022-08-11 | End: 2022-08-11

## 2022-08-11 RX ORDER — SODIUM CHLORIDE 9 MG/ML
1000 INJECTION INTRAMUSCULAR; INTRAVENOUS; SUBCUTANEOUS
Refills: 0 | Status: DISCONTINUED | OUTPATIENT
Start: 2022-08-12 | End: 2022-08-14

## 2022-08-11 RX ORDER — HEPARIN SODIUM 5000 [USP'U]/ML
3800 INJECTION INTRAVENOUS; SUBCUTANEOUS EVERY 6 HOURS
Refills: 0 | Status: DISCONTINUED | OUTPATIENT
Start: 2022-08-11 | End: 2022-08-12

## 2022-08-11 RX ORDER — HEPARIN SODIUM 5000 [USP'U]/ML
INJECTION INTRAVENOUS; SUBCUTANEOUS
Qty: 25000 | Refills: 0 | Status: DISCONTINUED | OUTPATIENT
Start: 2022-08-11 | End: 2022-08-12

## 2022-08-11 RX ORDER — METOPROLOL TARTRATE 50 MG
12.5 TABLET ORAL EVERY 12 HOURS
Refills: 0 | Status: DISCONTINUED | OUTPATIENT
Start: 2022-08-11 | End: 2022-08-14

## 2022-08-11 RX ORDER — TICAGRELOR 90 MG/1
1 TABLET ORAL
Qty: 60 | Refills: 0
Start: 2022-08-11 | End: 2022-09-09

## 2022-08-11 RX ORDER — HEPARIN SODIUM 5000 [USP'U]/ML
3800 INJECTION INTRAVENOUS; SUBCUTANEOUS ONCE
Refills: 0 | Status: COMPLETED | OUTPATIENT
Start: 2022-08-11 | End: 2022-08-11

## 2022-08-11 RX ORDER — NITROGLYCERIN 6.5 MG
5 CAPSULE, EXTENDED RELEASE ORAL
Qty: 50 | Refills: 0 | Status: DISCONTINUED | OUTPATIENT
Start: 2022-08-11 | End: 2022-08-11

## 2022-08-11 RX ORDER — NITROGLYCERIN 6.5 MG
5 CAPSULE, EXTENDED RELEASE ORAL
Qty: 50 | Refills: 0 | Status: DISCONTINUED | OUTPATIENT
Start: 2022-08-11 | End: 2022-08-12

## 2022-08-11 RX ADMIN — Medication 25 GRAM(S): at 08:53

## 2022-08-11 RX ADMIN — TICAGRELOR 90 MILLIGRAM(S): 90 TABLET ORAL at 17:04

## 2022-08-11 RX ADMIN — TICAGRELOR 90 MILLIGRAM(S): 90 TABLET ORAL at 05:47

## 2022-08-11 RX ADMIN — HEPARIN SODIUM 750 UNIT(S)/HR: 5000 INJECTION INTRAVENOUS; SUBCUTANEOUS at 08:52

## 2022-08-11 RX ADMIN — Medication 12.5 MILLIGRAM(S): at 17:05

## 2022-08-11 RX ADMIN — ATORVASTATIN CALCIUM 80 MILLIGRAM(S): 80 TABLET, FILM COATED ORAL at 21:12

## 2022-08-11 RX ADMIN — Medication 12.5 MILLIGRAM(S): at 08:55

## 2022-08-11 RX ADMIN — ENOXAPARIN SODIUM 40 MILLIGRAM(S): 100 INJECTION SUBCUTANEOUS at 05:47

## 2022-08-11 RX ADMIN — HEPARIN SODIUM 3800 UNIT(S): 5000 INJECTION INTRAVENOUS; SUBCUTANEOUS at 08:52

## 2022-08-11 RX ADMIN — Medication 1.5 MICROGRAM(S)/MIN: at 15:00

## 2022-08-11 RX ADMIN — HEPARIN SODIUM 750 UNIT(S)/HR: 5000 INJECTION INTRAVENOUS; SUBCUTANEOUS at 15:28

## 2022-08-11 RX ADMIN — CHLORHEXIDINE GLUCONATE 1 APPLICATION(S): 213 SOLUTION TOPICAL at 05:52

## 2022-08-11 RX ADMIN — Medication 40 MILLIEQUIVALENT(S): at 08:51

## 2022-08-11 RX ADMIN — Medication 100 MICROGRAM(S): at 05:47

## 2022-08-11 RX ADMIN — Medication 81 MILLIGRAM(S): at 13:17

## 2022-08-11 NOTE — PROGRESS NOTE ADULT - SUBJECTIVE AND OBJECTIVE BOX
Patient is a 82y old  Female who presents with a chief complaint of STEMI (10 Aug 2022 16:49)      HPI      INTERVAL HPI/OVERNIGHT EVENTS:   No overnight events   Afebrile, hemodynamically stable     Subjective:    ICU Vital Signs Last 24 Hrs  T(C): 36.9 (11 Aug 2022 04:00), Max: 37 (11 Aug 2022 00:00)  T(F): 98.4 (11 Aug 2022 04:00), Max: 98.6 (11 Aug 2022 00:00)  HR: 80 (11 Aug 2022 07:00) (80 - 101)  BP: 121/58 (11 Aug 2022 07:00) (121/58 - 164/90)  BP(mean): 84 (11 Aug 2022 07:) (70 - 118)  ABP: --  ABP(mean): --  RR: 20 (11 Aug 2022 07:00) (19 - 52)  SpO2: 97% (11 Aug 2022 07:00) (93% - 99%)    O2 Parameters below as of 11 Aug 2022 07:00  Patient On (Oxygen Delivery Method): room air          I&O's Summary    10 Aug 2022 07:01  -  11 Aug 2022 07:00  --------------------------------------------------------  IN: 490 mL / OUT: 2700 mL / NET: -2210 mL          Daily Height in cm: 149.86 (10 Aug 2022 16:15)    Daily Weight in k.4 (11 Aug 2022 06:00)    Adult Advanced Hemodynamics Last 24 Hrs  CVP(mm Hg): --  CVP(cm H2O): --  CO: --  CI: --  PA: --  PA(mean): --  PCWP: --  SVR: --  SVRI: --  PVR: --  PVRI: --    EKG/Telemetry Events:    MEDICATIONS  (STANDING):  aspirin  chewable 81 milliGRAM(s) Chew daily  atorvastatin 80 milliGRAM(s) Oral at bedtime  chlorhexidine 2% Cloths 1 Application(s) Topical <User Schedule>  enoxaparin Injectable 40 milliGRAM(s) SubCutaneous every 24 hours  levothyroxine 100 MICROGram(s) Oral daily  nitroglycerin  Infusion 10 MICROgram(s)/Min (3 mL/Hr) IV Continuous <Continuous>  sodium chloride 0.9%. 1000 milliLiter(s) (75 mL/Hr) IV Continuous <Continuous>  ticagrelor 90 milliGRAM(s) Oral every 12 hours    MEDICATIONS  (PRN):        LABS:                        13.1   13.34 )-----------( 335      ( 11 Aug 2022 05:39 )             39.5         138  |  98  |  15  ----------------------------<  127<H>  3.9   |  29  |  0.7    Ca    9.6      11 Aug 2022 05:39  Phos  3.6       Mg     1.9         TPro  6.2  /  Alb  3.8  /  TBili  0.9  /  DBili  x   /  AST  57<H>  /  ALT  22  /  AlkPhos  156<H>      LIVER FUNCTIONS - ( 11 Aug 2022 05:39 )  Alb: 3.8 g/dL / Pro: 6.2 g/dL / ALK PHOS: 156 U/L / ALT: 22 U/L / AST: 57 U/L / GGT: x           PT/INR - ( 10 Aug 2022 14:17 )   PT: 10.90 sec;   INR: 0.95 ratio         PTT - ( 10 Aug 2022 14:17 )  PTT:38.5 sec  CAPILLARY BLOOD GLUCOSE          Troponin T, Serum: 0.89 ng/mL (08-10 @ 14:17)    CARDIAC MARKERS ( 10 Aug 2022 14:17 )  x     / 0.89 ng/mL / x     / x     / x                RADIOLOGY & ADDITIONAL TESTS:         Care Discussed with Consultants/Other Providers [ x] YES  [ ] NO           Patient is a 82y old  Female who presents with a chief complaint of STEMI (10 Aug 2022 16:49)      HPI  This is a case of an 82 year old lady known to have HTN, dyslipidemia, CAD, and hypothyroidism, presented to the ED with chest pain of ~1-2 hours duration. Patient reports a progressively worsening central, non-radiating chest pain described as pressure that began around 10 AM, prompting call to EMS. Chest pain was associated with nausea and 2 episodes of NBNB vomiting as well as diaphoresis.   En route to the hospital, an EKG showed inferior STEMI, nitro given with moderate improvement of symptoms. Patient was asymptomatic in ED. Off note, patient reports that she had 2 episodes of chest pain on  and Monday prompting her to schedule an appointment with Dr Powers on next Monday.       In the ED, code STEMI was called. She was loaded with ASA and brilinta and transfered to cath lab.    Cath showed:   Right dominant circulation.  LM: Mild luminal irregularities  LAD: Prox LAD 99% stenosis at the site of origin of D1. Distal LAD mild disease.  D1 severe disease with 70% stenosis.   CX: Distal Cx small vessel mild with  luminal irregularities.  OM1 mild disease.  RCA: Distal RCA mild disease.   % occluded culprit lesion for pt presentation s/p balloon angioplasty.    Patient is planned for staged PCI to LAD on 22. Admit to CCU      INTERVAL HPI/OVERNIGHT EVENTS:   No overnight events   Afebrile, hemodynamically stable     Subjective:  Physical Exam:   General: Pt awake, oriented and resting comfortably in bed  Cardiac: normal rate and rhythm normal  s1 and s2, no murmurs, rubus or gallops   Respiratory: clear, vesicular breath sounds bilaterally, no wheezes, crackles or rhonchi  Abdominal: soft, non tender, nondistended    ICU Vital Signs Last 24 Hrs  T(C): 36.9 (11 Aug 2022 04:00), Max: 37 (11 Aug 2022 00:00)  T(F): 98.4 (11 Aug 2022 04:00), Max: 98.6 (11 Aug 2022 00:00)  HR: 80 (11 Aug 2022 07:00) (80 - 101)  BP: 121/58 (11 Aug 2022 07:00) (121/58 - 164/90)  BP(mean): 84 (11 Aug 2022 07:00) (70 - 118)  ABP: --  ABP(mean): --  RR: 20 (11 Aug 2022 07:00) (19 - 52)  SpO2: 97% (11 Aug 2022 07:00) (93% - 99%)    O2 Parameters below as of 11 Aug 2022 07:00  Patient On (Oxygen Delivery Method): room air          I&O's Summary    10 Aug 2022 07:01  -  11 Aug 2022 07:00  --------------------------------------------------------  IN: 490 mL / OUT: 2700 mL / NET: -2210 mL          Daily Height in cm: 149.86 (10 Aug 2022 16:15)    Daily Weight in k.4 (11 Aug 2022 06:00)    Adult Advanced Hemodynamics Last 24 Hrs  CVP(mm Hg): --  CVP(cm H2O): --  CO: --  CI: --  PA: --  PA(mean): --  PCWP: --  SVR: --  SVRI: --  PVR: --  PVRI: --    EKG/Telemetry Events:    MEDICATIONS  (STANDING):  aspirin  chewable 81 milliGRAM(s) Chew daily  atorvastatin 80 milliGRAM(s) Oral at bedtime  chlorhexidine 2% Cloths 1 Application(s) Topical <User Schedule>  enoxaparin Injectable 40 milliGRAM(s) SubCutaneous every 24 hours  levothyroxine 100 MICROGram(s) Oral daily  nitroglycerin  Infusion 10 MICROgram(s)/Min (3 mL/Hr) IV Continuous <Continuous>  sodium chloride 0.9%. 1000 milliLiter(s) (75 mL/Hr) IV Continuous <Continuous>  ticagrelor 90 milliGRAM(s) Oral every 12 hours    MEDICATIONS  (PRN):        LABS:                        13.1   13.34 )-----------( 335      ( 11 Aug 2022 05:39 )             39.5     08-11    138  |  98  |  15  ----------------------------<  127<H>  3.9   |  29  |  0.7    Ca    9.6      11 Aug 2022 05:39  Phos  3.6       Mg     1.9         TPro  6.2  /  Alb  3.8  /  TBili  0.9  /  DBili  x   /  AST  57<H>  /  ALT  22  /  AlkPhos  156<H>      LIVER FUNCTIONS - ( 11 Aug 2022 05:39 )  Alb: 3.8 g/dL / Pro: 6.2 g/dL / ALK PHOS: 156 U/L / ALT: 22 U/L / AST: 57 U/L / GGT: x           PT/INR - ( 10 Aug 2022 14:17 )   PT: 10.90 sec;   INR: 0.95 ratio         PTT - ( 10 Aug 2022 14:17 )  PTT:38.5 sec  CAPILLARY BLOOD GLUCOSE          Troponin T, Serum: 0.89 ng/mL (08-10 @ 14:17)    CARDIAC MARKERS ( 10 Aug 2022 14:17 )  x     / 0.89 ng/mL / x     / x     / x                RADIOLOGY & ADDITIONAL TESTS:         Care Discussed with Consultants/Other Providers [ x] YES  [ ] NO           Patient is a 82y old  Female who presents with a chief complaint of STEMI (10 Aug 2022 16:49)      HPI:  This is a case of an 82 year old lady known to have HTN, dyslipidemia, CAD, and hypothyroidism, presented to the ED with chest pain of ~1-2 hours duration. Patient reports a progressively worsening central, non-radiating chest pain described as pressure that began around 10 AM, prompting call to EMS. Chest pain was associated with nausea and 2 episodes of NBNB vomiting as well as diaphoresis.   En route to the hospital, an EKG showed inferior STEMI, nitro given with moderate improvement of symptoms. Patient was asymptomatic in ED. Off note, patient reports that she had 2 episodes of chest pain on  and Monday prompting her to schedule an appointment with Dr Powers on next Monday.       In the ED, code STEMI was called. She was loaded with ASA and brilinta and transfered to cath lab.    Cath showed:   Right dominant circulation.  LM: Mild luminal irregularities  LAD: Prox LAD 99% stenosis at the site of origin of D1. Distal LAD mild disease.  D1 severe disease with 70% stenosis.   CX: Distal Cx small vessel mild with  luminal irregularities.  OM1 mild disease.  RCA: Distal RCA mild disease.   % occluded culprit lesion for pt presentation s/p balloon angioplasty.    Patient is planned for staged PCI to LAD on 22. Admit to CCU      INTERVAL HPI/OVERNIGHT EVENTS:   No overnight events   Afebrile, hemodynamically stable   Remains on Nitro gtt.  Severe LAD lesion    Subjective:  Physical Exam:   General: Pt awake, oriented and resting comfortably in bed  Cardiac: normal rate and rhythm normal  s1 and s2, no murmurs, rubus or gallops   Respiratory: clear, vesicular breath sounds bilaterally, no wheezes, crackles or rhonchi  Abdominal: soft, non tender, nondistended    ICU Vital Signs Last 24 Hrs  T(C): 36.9 (11 Aug 2022 04:00), Max: 37 (11 Aug 2022 00:00)  T(F): 98.4 (11 Aug 2022 04:00), Max: 98.6 (11 Aug 2022 00:00)  HR: 80 (11 Aug 2022 07:00) (80 - 101)  BP: 121/58 (11 Aug 2022 07:00) (121/58 - 164/90)  BP(mean): 84 (11 Aug 2022 07:00) (70 - 118)  ABP: --  ABP(mean): --  RR: 20 (11 Aug 2022 07:00) (19 - 52)  SpO2: 97% (11 Aug 2022 07:00) (93% - 99%)    O2 Parameters below as of 11 Aug 2022 07:00  Patient On (Oxygen Delivery Method): room air          I&O's Summary    10 Aug 2022 07:01  -  11 Aug 2022 07:00  --------------------------------------------------------  IN: 490 mL / OUT: 2700 mL / NET: -2210 mL          Daily Height in cm: 149.86 (10 Aug 2022 16:15)    Daily Weight in k.4 (11 Aug 2022 06:00)    Adult Advanced Hemodynamics Last 24 Hrs  CVP(mm Hg): --  CVP(cm H2O): --  CO: --  CI: --  PA: --  PA(mean): --  PCWP: --  SVR: --  SVRI: --  PVR: --  PVRI: --    EKG/Telemetry Events:    MEDICATIONS  (STANDING):  aspirin  chewable 81 milliGRAM(s) Chew daily  atorvastatin 80 milliGRAM(s) Oral at bedtime  chlorhexidine 2% Cloths 1 Application(s) Topical <User Schedule>  enoxaparin Injectable 40 milliGRAM(s) SubCutaneous every 24 hours  levothyroxine 100 MICROGram(s) Oral daily  nitroglycerin  Infusion 10 MICROgram(s)/Min (3 mL/Hr) IV Continuous <Continuous>  sodium chloride 0.9%. 1000 milliLiter(s) (75 mL/Hr) IV Continuous <Continuous>  ticagrelor 90 milliGRAM(s) Oral every 12 hours    MEDICATIONS  (PRN):        LABS:                        13.1   13.34 )-----------( 335      ( 11 Aug 2022 05:39 )             39.5     08-11    138  |  98  |  15  ----------------------------<  127<H>  3.9   |  29  |  0.7    Ca    9.6      11 Aug 2022 05:39  Phos  3.6       Mg     1.9         TPro  6.2  /  Alb  3.8  /  TBili  0.9  /  DBili  x   /  AST  57<H>  /  ALT  22  /  AlkPhos  156<H>      LIVER FUNCTIONS - ( 11 Aug 2022 05:39 )  Alb: 3.8 g/dL / Pro: 6.2 g/dL / ALK PHOS: 156 U/L / ALT: 22 U/L / AST: 57 U/L / GGT: x           PT/INR - ( 10 Aug 2022 14:17 )   PT: 10.90 sec;   INR: 0.95 ratio         PTT - ( 10 Aug 2022 14:17 )  PTT:38.5 sec  CAPILLARY BLOOD GLUCOSE          Troponin T, Serum: 0.89 ng/mL (08-10 @ 14:17)    CARDIAC MARKERS ( 10 Aug 2022 14:17 )  x     / 0.89 ng/mL / x     / x     / x                RADIOLOGY & ADDITIONAL TESTS:         Care Discussed with Consultants/Other Providers [ x] YES  [ ] NO

## 2022-08-11 NOTE — PROGRESS NOTE ADULT - ASSESSMENT
This is case of an 82 year old female KTH HTN, DL, CAD presenting for chest pain. Found to have STEMI s/p RPL balloon angioplasty. Admitted to CCU pending staged procedure on Friday 8/12/22.    # Chest pain - Resolved  # STEMI  # CAD    - Patient found to inferior STEMI on EKG  - S/p nitro en route to the hospital  - Loaded with Aspirin and Brilinta in the ED  - Cath showed 2 vessel CAD with an RPDL 100% occlusion s/p balloon angioplasty  - Planned for staged procedure in 48 hours  - C/w DAPT --> ASA/brilinta  - admit to CCU  - 2d echo  - lipitor 80 mg QHS  - hold all B-blocker and AV node blocking agents    - Repeat ECG in AM  - F/u CXR  - Cardiac monitoring  - lipitor 80 mg QHS  - C/w nitro drip and contact cardiac fellow for worsening chest pain (target chest pain score 0/10. weaning parameters if no pain for SBP<120mmHg)  - F/u AM TSH, lipid profile and A1c    # Dyslipidemia  - C/w lipitor 80mg qHS  - F/u AM a1c    # HTN  - Currently on nitro-drip  - Close monitoring for HR and BP  - Holding home antihypertensive meds    # Hypothyroidism  - C/w levothyroxine 100mcg qD  - F/u AM TSH    Diet: DASH  Activity: IAT  DVT Prophylaxis: Lovenox 40mg qD  GI Prophylaxis: pantoprazole 40mg qD  CHG Order  Code Status: Full  Disposition: admit to CCU   This is case of an 82 year old female KTH HTN, DL, CAD presenting for chest pain. Found to have STEMI s/p RPL balloon angioplasty. Admitted to CCU pending staged procedure on Friday 8/12/22.   Assesment:   # Chest pain - Resolved  # STEMI  # CAD    Plan:    Neuro:      CARDIAC:   - Patient found to inferior STEMI on EKG  - S/p nitro en route to the hospital  - Loaded with Aspirin and Brilinta in the ED  - Cath showed 2 vessel CAD with an RPDL 100% occlusion s/p balloon angioplasty  - Planned for staged procedure friday   - C/w DAPT --> ASA/brilinta  - admit to CCU  - 2d echo  - lipitor 80 mg QHS  - hold all B-blocker and AV node blocking agents  - f/u Repeat ECG   - F/u CXR   - Cardiac monitoring  - lipitor 80 mg QHS  - C/w nitro drip and contact cardiac fellow for worsening chest pain (target chest pain score 0/10. weaning parameters if no pain for SBP<120mmHg)  -Hold home BP meds  - F/u AM TSH, lipid profile and A1c    GI:   - DASH diet  -GI ppx 40 pantoprazole    ENDOCRINE:     - F/u AM a1c  - C/w levothyroxine 100mcg qD  - F/u AM TSH      MSK:   Activity: IAT    Heme:   DVT Prophylaxis: Lovenox 40mg qD    CHG Order  Code Status: Full  Disposition: admit to CCU    Assesment:   # STEMI s/p Balloon angioplasty to RPL suspected embolic source  # 2V CAD Mid LAD lesion  # New onset HFrEF (35%-40%) - euvolemic  # Hx HTN, HLD      Plan:    Neuro:      CARDIAC:   - Patient found to inferior STEMI on EKG  - S/p nitro en route to the hospital  - Loaded with Aspirin and Brilinta in the ED  - Cath showed 2 vessel CAD with an RPDL 100% occlusion s/p balloon angioplasty  - Planned for staged procedure friday of LAD  - C/w DAPT --> ASA/brilinta  - Echo shows EF 35% with apical WMA  - lipitor 80 mg QHS  - Metoprolol 12.5mg BID  - Heparin drip and Nitro until LAD is revascularized    GI:   - DASH diet  -GI ppx 40 pantoprazole    ENDOCRINE:     - F/u AM a1c  - C/w levothyroxine 100mcg qD  - F/u AM TSH      MSK:   Activity: IAT    Heme:   DVT Prophylaxis: Lovenox 40mg qD    CHG Order  Code Status: Full  Disposition: admit to CCU    Assesment:   # STEMI s/p Balloon angioplasty to RPL suspected embolic source  # 2V CAD Mid LAD lesion  # New onset HFrEF (35%-40%) - euvolemic  # Hx HTN, HLD      Plan:    Neuro:      CARDIAC:   - Patient found to have inferior STEMI on EKG  - S/p nitro en route to the hospital  - Loaded with Aspirin and Brilinta in the ED  - Cath showed 2 vessel CAD with an RPDL 100% occlusion s/p balloon angioplasty  - Planned for staged procedure friday of LAD - 99% proximal lesion  - C/w DAPT --> ASA/brilinta  - Echo shows EF 35% with apical WMA  - lipitor 80 mg QHS  - Metoprolol 12.5mg BID  - Heparin drip and Nitro until LAD is revascularized    GI:   - DASH diet  -GI ppx 40 pantoprazole    ENDOCRINE:     - F/u AM a1c  - C/w levothyroxine 100mcg qD  - F/u AM TSH      MSK:   Activity: IAT    Heme:   DVT Prophylaxis: Lovenox 40mg qD    CHG Order  Code Status: Full  Disposition: admit to CCU

## 2022-08-11 NOTE — PROGRESS NOTE ADULT - SUBJECTIVE AND OBJECTIVE BOX
Cardiology Follow up    BILL LUGO   82y Female  PAST MEDICAL & SURGICAL HISTORY:  Hypertension      Dyslipidemia      CAD (coronary artery disease)           HPI:  This is a case of an 82 year old lady known to have HTN, dyslipidemia, CAD, and hypothyroidism, presented to the ED with chest pain of ~1-2 hours duration. Patient reports a progressively worsening central, non-radiating chest pain described as pressure that began around 10 AM, prompting call to EMS. Chest pain was associated with nausea and 2 episodes of NBNB vomiting as well as diaphoresis.   En route to the hospital, an EKG showed inferior STEMI, nitro given with moderate improvement of symptoms. Patient was asymptomatic in ED. Off note, patient reports that she had 2 episodes of chest pain on  and Monday prompting her to schedule an appointment with Dr Powers on next Monday.       In the ED, code STEMI was called. She was loaded with ASA and brilinta and transfered to cath lab.    Cath showed:   Right dominant circulation.  LM: Mild luminal irregularities  LAD: Prox LAD 99% stenosis at the site of origin of D1. Distal LAD mild disease.  D1 severe disease with 70% stenosis.   CX: Distal Cx small vessel mild with  luminal irregularities.  OM1 mild disease.  RCA: Distal RCA mild disease.   % occluded culprit lesion for pt presentation s/p balloon angioplasty.    Patient is planned for staged PCI to LAD on 22. Admit to CCU (10 Aug 2022 16:49)    Allergies    No Known Allergies    Intolerances      Patient seen and examined at bedside. No acute events overnight.  Patient without complaints. Pt ambulated without issues/symptoms  Denies CP, SOB, palpitations, or dizziness  No events on telemetry overnight    Vital Signs Last 24 Hrs  T(C): 36.5 (11 Aug 2022 08:00), Max: 37 (11 Aug 2022 00:00)  T(F): 97.7 (11 Aug 2022 08:00), Max: 98.6 (11 Aug 2022 00:00)  HR: 80 (11 Aug 2022 10:00) (79 - 101)  BP: 104/55 (11 Aug 2022 10:00) (104/55 - 164/90)  BP(mean): 72 (11 Aug 2022 10:00) (70 - 118)  RR: 20 (11 Aug 2022 10:00) (16 - 52)  SpO2: 96% (11 Aug 2022 10:00) (93% - 99%)    Parameters below as of 11 Aug 2022 10:00  Patient On (Oxygen Delivery Method): room air        MEDICATIONS  (STANDING):  aspirin  chewable 81 milliGRAM(s) Chew daily  atorvastatin 80 milliGRAM(s) Oral at bedtime  chlorhexidine 2% Cloths 1 Application(s) Topical <User Schedule>  heparin  Infusion.  Unit(s)/Hr (7.5 mL/Hr) IV Continuous <Continuous>  levothyroxine 100 MICROGram(s) Oral daily  metoprolol tartrate 12.5 milliGRAM(s) Oral every 12 hours  nitroglycerin  Infusion 5 MICROgram(s)/Min (1.5 mL/Hr) IV Continuous <Continuous>  sodium chloride 0.9%. 1000 milliLiter(s) (75 mL/Hr) IV Continuous <Continuous>  ticagrelor 90 milliGRAM(s) Oral every 12 hours    MEDICATIONS  (PRN):  heparin   Injectable 3800 Unit(s) IV Push every 6 hours PRN For aPTT less than 40      REVIEW OF SYSTEMS:          All negative except as mentioned in HPI    PHYSICAL EXAM:           CONSTITUTIONAL: Well-developed; well-nourished; in no acute distress  	SKIN: warm, dry  	HEAD: Normocephalic; atraumatic  	EYES: PERRL.  	ENT: No nasal discharge, airway clear, mucous membranes moist  	NECK: Supple; non tender.  	CARD: +S1, +S2, no murmurs, gallops, or rubs. Regular rate and rhythm    	RESP: No wheezes, rales or rhonchi. CTA B/L  	ABD: soft ntnd, + BS x 4 quadrants  	EXT: moves all extremities,  no clubbing, cyanosis or edema  	NEURO: Alert and oriented x3, no focal deficits          PSYCH: Cooperative, appropriate          VASCULAR:  + Rad / + PTs / +  DPs          EXTREMITY:             Right Groin:  Dressing D/C/I, pressure dressing removed, access site soft, no hematoma, no pain, + pulses, no sign of infection, no numbness  	               EC Lead ECG (08.10.22 @ 14:09)   Ventricular Rate 100 BPM      Diagnosis Line Normal sinus rhythm  Left axis deviation  Left anterior fascicular block  Right bundle branch block  Inferior infarct , age undetermined  Anteroseptal infarct , age undetermined                                                                                                                    2D ECHO:   TTE Echo Complete w/o Contrast w/ Doppler (08.10.22 @ 15:41)   Summary:   1. Left ventricular ejection fraction, by visual estimation, is 35 to   40%.   2. Multiple left ventricular regional wall motion abnormalities exist.   See wall motion findings.   3. Moderate asymmetric left ventricular hypertrophy.   4. Spectral Doppler shows impaired relaxation pattern of left   ventricular myocardial filling (Grade I diastolic dysfunction).   5. No evidence of LV thrombus on Lumason injection.   6. Mildly enlarged left atrium.   7. Normal right atrial size.   8. Small pericardial effusion.   9. Degenerative mitral valve.  10. Moderate thickening and calcification of the anterior and posterior   mitral valve leaflets.  11. Severe mitral annular calcification.  12. Trace mitral valve regurgitation.  13. Mild tricuspid regurgitation.  14. Sclerotic aortic valve with decreased opening.  15. Mild pulmonic valve regurgitation.    PHYSICIAN INTERPRETATION:  Left Ventricle: The left ventricular internal cavity size is normal.   There is moderate asymmetric left ventricular hypertrophy. Left   ventricular ejection fraction, by visual estimation, is 35 to 40%.   Spectral Doppler shows impaired relaxation pattern of left ventricular   myocardial filling (Grade I diastolic dysfunction). No evidence of LV   thrombus on Lumason injection.      LV Wall Scoring:  The apical septal segment, apical anterior segment, and apex are   dyskinetic.  The mid anteroseptal segment, mid inferoseptal segment, and mid anterior  segment are hypokinetic. All remaining scored segments are normal.    Right Ventricle: Normal right ventricular size and function.  Left Atrium: Mildly enlarged left atrium.  Right Atrium: Normal right atrial size.  Pericardium: A small pericardialeffusion is present.  Mitral Valve: The mitral valve is degenerative in appearance. Moderate   thickening and calcification of the anterior and posterior mitral valve   leaflets. There is severe mitral annular calcification. Trace mitral   valve regurgitation is seen.  Tricuspid Valve: Structurally normal tricuspid valve, with normal leaflet   excursion. Mild tricuspid regurgitation is visualized.  Aortic Valve: The aortic valve is trileaflet. No evidence of aortic   stenosis. Sclerotic aortic valve with decreased opening. No evidence of   aortic valve regurgitation is seen.  Pulmonic Valve: The pulmonic valve was not well visualized. Mild pulmonic   valve regurgitation.  Aorta: The aortic root is normal in size and structure.  Venous: The inferior vena cava was normal sized, with respiratory size   variation greater than 50%.    < end of copied text >          LABS:                        13.1   13.34 )-----------( 335      ( 11 Aug 2022 05:39 )             39.5     08-11    138  |  98  |  15  ----------------------------<  127<H>  3.9   |  29  |  0.7    Ca    9.6      11 Aug 2022 05:39  Phos  3.6       Mg     1.9         TPro  6.2  /  Alb  3.8  /  TBili  0.9  /  DBili  x   /  AST  57<H>  /  ALT  22  /  AlkPhos  156<H>      CARDIAC MARKERS ( 10 Aug 2022 14:17 )  x     / 0.89 ng/mL / x     / x     / x          Magnesium, Serum: 1.9 mg/dL [1.8 - 2.4] (22 @ 05:39)  LIVER FUNCTIONS - ( 11 Aug 2022 05:39 )  Alb: 3.8 g/dL / Pro: 6.2 g/dL / ALK PHOS: 156 U/L / ALT: 22 U/L / AST: 57 U/L / GGT: x             A/P:  I discussed the case with Cardiologist Dr. Olson   and recommend the following:    S/P PCI:      Intervention: s/p balloon angioplasty of 100% RPL lesion    Implants: None     FINDINGS:     Coronary Dominance: Right      LM: Mild luminal irregularities    LAD: Prox LAD 95% stenosis at the site of origin of D1. Distal LAD mild disease.  D1 severe disease with 70% stenosis.     CX: Distal Cx small vessel mild with  luminal irregularities.  OM1 mild disease.    RCA: Distal RCA mild disease.   % occluded s/p balloon angioplasty.      LVEDP: 36 mmHg        POST-OP DIAGNOSIS:      [x] 2 Vessel Coronary Artery Disease: (RCA;RPL and LAD)        PLAN OF CARE:     Return for Staged Procedure on 22  Check  lipid profile, HbA1c  F/U AM EKG                       	         Continue DAPT ( Aspirin 81 mg PO Daily and  Brilinta 90mg po twice a day  ),  B-Blocker, Statin Therapy                                     ****** Patient pharmacy called in for Brilinta  prescription and it is  free for first month, then Brilinta rx will be $200 per month until deductible is met. *****                       Patient given 30 day supply of ( Aspirin 81 mg daily and Plavix 75 mg daily ) to take at home                     Patient agreeing to take DAPT for at least one year or as directed by cardiologist                    Pt given instructions on importance of taking antiplatelet medication or risk acute stent thrombosis/death                   Post cath instructions, access site care and activity restrictions reviewed with patient                     Discussed with patient to return to hospital if experience chest pain, shortness breath, dizziness and site bleeding                   Aggressive risk factor modification, diet counseling, smoking cessation discussed with patient                       Can discharge patient from cardiac standpoint at --  after ambulating without symptoms and access site wnl, ECG and blood work reviewed                    Benefis of Cardiac Rehab discussed with patient, All documents [facility]. Patient instructed to call and make first                               appointment after first f/u visit with Cardiologist                    Follow up with Cardiology Dr.   -----------       in  two weeks.  Instructed to call and make an appointment                      Discharge instructions as follows, when ready to d/c:                   - Continue medical regimen as prescribed to prevent chest pain                   - Continue dual anti-platelet therapy, beta blocker, statin                   - If you are diabetic and taking medication containing Metformin, do not take them for 48 hours after the procedure                   - Instructed to call 911 if chest pain, shortness of breath or bleeding from access site                   - No heavy lifting >10lbs x 1 week                   - No driving x 24 hours                   - No baths, swimming pools x 1 week, may shower                   - Low sodium low fat low cholesterol diet                   - Follow-up with Cardiologist in 1-2 weeks after discharge                                          Cardiology Follow up    BILL LUGO   82y Female  PAST MEDICAL & SURGICAL HISTORY:  Hypertension      Dyslipidemia      CAD (coronary artery disease)           HPI:  This is a case of an 82 year old lady known to have HTN, dyslipidemia, CAD, and hypothyroidism, presented to the ED with chest pain of ~1-2 hours duration. Patient reports a progressively worsening central, non-radiating chest pain described as pressure that began around 10 AM, prompting call to EMS. Chest pain was associated with nausea and 2 episodes of NBNB vomiting as well as diaphoresis.   En route to the hospital, an EKG showed inferior STEMI, nitro given with moderate improvement of symptoms. Patient was asymptomatic in ED. Off note, patient reports that she had 2 episodes of chest pain on  and Monday prompting her to schedule an appointment with Dr Powers on next Monday.       In the ED, code STEMI was called. She was loaded with ASA and brilinta and transfered to cath lab.    Cath showed:   Right dominant circulation.  LM: Mild luminal irregularities  LAD: Prox LAD 99% stenosis at the site of origin of D1. Distal LAD mild disease.  D1 severe disease with 70% stenosis.   CX: Distal Cx small vessel mild with  luminal irregularities.  OM1 mild disease.  RCA: Distal RCA mild disease.   % occluded culprit lesion for pt presentation s/p balloon angioplasty.    Patient is planned for staged PCI to LAD on 22. Admit to CCU (10 Aug 2022 16:49)    Allergies    No Known Allergies    Intolerances      Patient seen and examined at bedside. No acute events overnight.  Patient without complaints. Pt ambulated without issues/symptoms  Denies CP, SOB, palpitations, or dizziness  No events on telemetry overnight    Vital Signs Last 24 Hrs  T(C): 36.5 (11 Aug 2022 08:00), Max: 37 (11 Aug 2022 00:00)  T(F): 97.7 (11 Aug 2022 08:00), Max: 98.6 (11 Aug 2022 00:00)  HR: 80 (11 Aug 2022 10:00) (79 - 101)  BP: 104/55 (11 Aug 2022 10:00) (104/55 - 164/90)  BP(mean): 72 (11 Aug 2022 10:00) (70 - 118)  RR: 20 (11 Aug 2022 10:00) (16 - 52)  SpO2: 96% (11 Aug 2022 10:00) (93% - 99%)    Parameters below as of 11 Aug 2022 10:00  Patient On (Oxygen Delivery Method): room air        MEDICATIONS  (STANDING):  aspirin  chewable 81 milliGRAM(s) Chew daily  atorvastatin 80 milliGRAM(s) Oral at bedtime  chlorhexidine 2% Cloths 1 Application(s) Topical <User Schedule>  heparin  Infusion.  Unit(s)/Hr (7.5 mL/Hr) IV Continuous <Continuous>  levothyroxine 100 MICROGram(s) Oral daily  metoprolol tartrate 12.5 milliGRAM(s) Oral every 12 hours  nitroglycerin  Infusion 5 MICROgram(s)/Min (1.5 mL/Hr) IV Continuous <Continuous>  sodium chloride 0.9%. 1000 milliLiter(s) (75 mL/Hr) IV Continuous <Continuous>  ticagrelor 90 milliGRAM(s) Oral every 12 hours    MEDICATIONS  (PRN):  heparin   Injectable 3800 Unit(s) IV Push every 6 hours PRN For aPTT less than 40      REVIEW OF SYSTEMS:          All negative except as mentioned in HPI    PHYSICAL EXAM:           CONSTITUTIONAL: Well-developed; well-nourished; in no acute distress  	SKIN: warm, dry  	HEAD: Normocephalic; atraumatic  	EYES: PERRL.  	ENT: No nasal discharge, airway clear, mucous membranes moist  	NECK: Supple; non tender.  	CARD: +S1, +S2, no murmurs, gallops, or rubs. Regular rate and rhythm    	RESP: No wheezes, rales or rhonchi. CTA B/L  	ABD: soft ntnd, + BS x 4 quadrants  	EXT: moves all extremities,  no clubbing, cyanosis or edema  	NEURO: Alert and oriented x3, no focal deficits          PSYCH: Cooperative, appropriate          VASCULAR:  + Rad / + PTs / +  DPs          EXTREMITY:             Right Groin:  Dressing D/C/I, pressure dressing removed, access site soft, no hematoma, no pain, + pulses, no sign of infection, no numbness  	               EC Lead ECG (08.10.22 @ 14:09)   Ventricular Rate 100 BPM      Diagnosis Line Normal sinus rhythm  Left axis deviation  Left anterior fascicular block  Right bundle branch block  Inferior infarct , age undetermined  Anteroseptal infarct , age undetermined                                                                                                                    2D ECHO:   TTE Echo Complete w/o Contrast w/ Doppler (08.10.22 @ 15:41)   Summary:   1. Left ventricular ejection fraction, by visual estimation, is 35 to   40%.   2. Multiple left ventricular regional wall motion abnormalities exist.   See wall motion findings.   3. Moderate asymmetric left ventricular hypertrophy.   4. Spectral Doppler shows impaired relaxation pattern of left   ventricular myocardial filling (Grade I diastolic dysfunction).   5. No evidence of LV thrombus on Lumason injection.   6. Mildly enlarged left atrium.   7. Normal right atrial size.   8. Small pericardial effusion.   9. Degenerative mitral valve.  10. Moderate thickening and calcification of the anterior and posterior   mitral valve leaflets.  11. Severe mitral annular calcification.  12. Trace mitral valve regurgitation.  13. Mild tricuspid regurgitation.  14. Sclerotic aortic valve with decreased opening.  15. Mild pulmonic valve regurgitation.    PHYSICIAN INTERPRETATION:  Left Ventricle: The left ventricular internal cavity size is normal.   There is moderate asymmetric left ventricular hypertrophy. Left   ventricular ejection fraction, by visual estimation, is 35 to 40%.   Spectral Doppler shows impaired relaxation pattern of left ventricular   myocardial filling (Grade I diastolic dysfunction). No evidence of LV   thrombus on Lumason injection.      LV Wall Scoring:  The apical septal segment, apical anterior segment, and apex are   dyskinetic.  The mid anteroseptal segment, mid inferoseptal segment, and mid anterior  segment are hypokinetic. All remaining scored segments are normal.    Right Ventricle: Normal right ventricular size and function.  Left Atrium: Mildly enlarged left atrium.  Right Atrium: Normal right atrial size.  Pericardium: A small pericardialeffusion is present.  Mitral Valve: The mitral valve is degenerative in appearance. Moderate   thickening and calcification of the anterior and posterior mitral valve   leaflets. There is severe mitral annular calcification. Trace mitral   valve regurgitation is seen.  Tricuspid Valve: Structurally normal tricuspid valve, with normal leaflet   excursion. Mild tricuspid regurgitation is visualized.  Aortic Valve: The aortic valve is trileaflet. No evidence of aortic   stenosis. Sclerotic aortic valve with decreased opening. No evidence of   aortic valve regurgitation is seen.  Pulmonic Valve: The pulmonic valve was not well visualized. Mild pulmonic   valve regurgitation.  Aorta: The aortic root is normal in size and structure.  Venous: The inferior vena cava was normal sized, with respiratory size   variation greater than 50%.              LABS:                        13.1   13.34 )-----------( 335      ( 11 Aug 2022 05:39 )             39.5     08-    138  |  98  |  15  ----------------------------<  127<H>  3.9   |  29  |  0.7    Ca    9.6      11 Aug 2022 05:39  Phos  3.6       Mg     1.9         TPro  6.2  /  Alb  3.8  /  TBili  0.9  /  DBili  x   /  AST  57<H>  /  ALT  22  /  AlkPhos  156<H>      CARDIAC MARKERS ( 10 Aug 2022 14:17 )  x     / 0.89 ng/mL / x     / x     / x          Magnesium, Serum: 1.9 mg/dL [1.8 - 2.4] (22 @ 05:39)  LIVER FUNCTIONS - ( 11 Aug 2022 05:39 )  Alb: 3.8 g/dL / Pro: 6.2 g/dL / ALK PHOS: 156 U/L / ALT: 22 U/L / AST: 57 U/L / GGT: x             A/P:  I discussed the case with Cardiologist Dr. Olson   and recommend the following:    S/P PCI:      Intervention: s/p balloon angioplasty of 100% RPL lesion    Implants: None     FINDINGS:     Coronary Dominance: Right      LM: Mild luminal irregularities    LAD: Prox LAD 95% stenosis at the site of origin of D1. Distal LAD mild disease.  D1 severe disease with 70% stenosis.     CX: Distal Cx small vessel mild with  luminal irregularities.  OM1 mild disease.    RCA: Distal RCA mild disease.   % occluded s/p balloon angioplasty.      LVEDP: 36 mmHg        POST-OP DIAGNOSIS:      [x] 2 Vessel Coronary Artery Disease: (RCA;RPL and LAD)        PLAN OF CARE:     Return for Staged Procedure on 22  Check  lipid profile, HbA1c  F/U AM EKG                       	         Continue DAPT ( Aspirin 81 mg PO Daily and  Brilinta 90mg po twice a day  ),  B-Blocker, Statin Therapy                   No ACEi/ARb at this time d/t borderline BP.  Eval for need if BP tolerates.                    Patient pharmacy called in for Brilinta  prescription and it is  free for first month, then Brilinta rx will be $200 per month until deductible is met. Pt will get more samples from office.                    Patient agreeing to take DAPT for at least one year or as directed by cardiologist                    Pt given instructions on importance of taking antiplatelet medication or risk acute stent thrombosis/death                   Post cath instructions, access site care and activity restrictions reviewed with patient                     Discussed with patient to return to hospital if experience chest pain, shortness breath, dizziness and site bleeding                   Aggressive risk factor modification, diet counseling, smoking cessation discussed with patient                       Benefits of Cardiac Rehab discussed with patient, All documents sent to [facility]. Patient instructed to call and make first                               appointment after first f/u visit with Cardiologist                    Follow up with Cardiology Dr. Powers  in  two weeks.  Instructed to call and make an appointment                      Discharge instructions as follows, when ready to d/c:                   - Continue medical regimen as prescribed to prevent chest pain                   - Continue dual anti-platelet therapy, beta blocker, statin                   - If you are diabetic and taking medication containing Metformin, do not take them for 48 hours after the procedure                   - Instructed to call 911 if chest pain, shortness of breath or bleeding from access site                   - No heavy lifting >10lbs x 1 week                   - No driving x 24 hours                   - No baths, swimming pools x 1 week, may shower                   - Low sodium low fat low cholesterol diet                   - Follow-up with Cardiologist in 1-2 weeks after discharge

## 2022-08-11 NOTE — CHART NOTE - NSCHARTNOTEFT_GEN_A_CORE
Pre cath note:    Indication:  [ ] STEMI                [ ] NSTEMI                 [ ] Acute coronary syndrome                     [ ]Unstable Angina   [ ] high risk  [ ] intermediate risk  [ ] low risk                     [x ] Stable Angina     non-invasive testing:                          Date:                     result: [ ] high risk  [ ] intermediate risk  [ ] low risk    Anti- Anginal medications:                    [ ] not used                       [ x] used                   [ ] not used but strong indication not to use    Ejection Fraction                   [ ] <29            [x ] 30-39%   [ ] 40-49%     [ ]>50%                            Heart Failure NYHA Class (1-4):                       CHF                   [ ] Active (within last 14 days on meds   [ ] Chronic (on meds but no exacerbation)    COPD                   [ ] mild (on chronic bronchodilators)  [ ] moderate (on chronic steroid therapy)      [ ] severe (indication for home O2 or PACO2 >50)    Other risk factors:                       [ ] Previous MI                     [ ] CVA/ stroke                    [ ] carotid stent/ CEA                    [ ] PVD/PAD- (arterial aneurysm, non-palpable pulses, tortuous vessel with inability to insert catheter, infra-renal dissection, renal or subclavian artery stenosis)                    [ ] diabetic                    [ ] previous CABG                    [ ] Renal Failure                           13.1   13.34 )-----------( 335      ( 11 Aug 2022 05:39 )             39.5     08-11    138  |  98  |  15  ----------------------------<  127<H>  3.9   |  29  |  0.7    Ca    9.6      11 Aug 2022 05:39  Phos  3.6     08-11  Mg     1.9     08-11    TPro  6.2  /  Alb  3.8  /  TBili  0.9  /  DBili  x   /  AST  57<H>  /  ALT  22  /  AlkPhos  156<H>  08-11                         -Patient Schedule for LHC/PCI tomorrow at 7:00 am  -Keep NPO after midnight  -Hold Anticoagulation prior to PCI  -Start IV Fluids as ordered

## 2022-08-12 ENCOUNTER — TRANSCRIPTION ENCOUNTER (OUTPATIENT)
Age: 82
End: 2022-08-12

## 2022-08-12 LAB
ALBUMIN SERPL ELPH-MCNC: 3.6 G/DL — SIGNIFICANT CHANGE UP (ref 3.5–5.2)
ALP SERPL-CCNC: 149 U/L — HIGH (ref 30–115)
ALT FLD-CCNC: 31 U/L — SIGNIFICANT CHANGE UP (ref 0–41)
ANION GAP SERPL CALC-SCNC: 11 MMOL/L — SIGNIFICANT CHANGE UP (ref 7–14)
APTT BLD: 44.8 SEC — HIGH (ref 27–39.2)
AST SERPL-CCNC: 44 U/L — HIGH (ref 0–41)
BASOPHILS # BLD AUTO: 0.11 K/UL — SIGNIFICANT CHANGE UP (ref 0–0.2)
BASOPHILS NFR BLD AUTO: 0.8 % — SIGNIFICANT CHANGE UP (ref 0–1)
BILIRUB SERPL-MCNC: 0.5 MG/DL — SIGNIFICANT CHANGE UP (ref 0.2–1.2)
BUN SERPL-MCNC: 18 MG/DL — SIGNIFICANT CHANGE UP (ref 10–20)
CALCIUM SERPL-MCNC: 9 MG/DL — SIGNIFICANT CHANGE UP (ref 8.5–10.1)
CHLORIDE SERPL-SCNC: 104 MMOL/L — SIGNIFICANT CHANGE UP (ref 98–110)
CO2 SERPL-SCNC: 23 MMOL/L — SIGNIFICANT CHANGE UP (ref 17–32)
CREAT SERPL-MCNC: 0.6 MG/DL — LOW (ref 0.7–1.5)
EGFR: 90 ML/MIN/1.73M2 — SIGNIFICANT CHANGE UP
EOSINOPHIL # BLD AUTO: 0.46 K/UL — SIGNIFICANT CHANGE UP (ref 0–0.7)
EOSINOPHIL NFR BLD AUTO: 3.4 % — SIGNIFICANT CHANGE UP (ref 0–8)
GLUCOSE SERPL-MCNC: 133 MG/DL — HIGH (ref 70–99)
HCT VFR BLD CALC: 38.6 % — SIGNIFICANT CHANGE UP (ref 37–47)
HGB BLD-MCNC: 12.4 G/DL — SIGNIFICANT CHANGE UP (ref 12–16)
IMM GRANULOCYTES NFR BLD AUTO: 0.3 % — SIGNIFICANT CHANGE UP (ref 0.1–0.3)
LYMPHOCYTES # BLD AUTO: 15.7 % — LOW (ref 20.5–51.1)
LYMPHOCYTES # BLD AUTO: 2.12 K/UL — SIGNIFICANT CHANGE UP (ref 1.2–3.4)
MAGNESIUM SERPL-MCNC: 2 MG/DL — SIGNIFICANT CHANGE UP (ref 1.8–2.4)
MCHC RBC-ENTMCNC: 27.3 PG — SIGNIFICANT CHANGE UP (ref 27–31)
MCHC RBC-ENTMCNC: 32.1 G/DL — SIGNIFICANT CHANGE UP (ref 32–37)
MCV RBC AUTO: 85 FL — SIGNIFICANT CHANGE UP (ref 81–99)
MONOCYTES # BLD AUTO: 1.52 K/UL — HIGH (ref 0.1–0.6)
MONOCYTES NFR BLD AUTO: 11.3 % — HIGH (ref 1.7–9.3)
NEUTROPHILS # BLD AUTO: 9.24 K/UL — HIGH (ref 1.4–6.5)
NEUTROPHILS NFR BLD AUTO: 68.5 % — SIGNIFICANT CHANGE UP (ref 42.2–75.2)
NRBC # BLD: 0 /100 WBCS — SIGNIFICANT CHANGE UP (ref 0–0)
PHOSPHATE SERPL-MCNC: 2.7 MG/DL — SIGNIFICANT CHANGE UP (ref 2.1–4.9)
PLATELET # BLD AUTO: 310 K/UL — SIGNIFICANT CHANGE UP (ref 130–400)
POTASSIUM SERPL-MCNC: 3.9 MMOL/L — SIGNIFICANT CHANGE UP (ref 3.5–5)
POTASSIUM SERPL-SCNC: 3.9 MMOL/L — SIGNIFICANT CHANGE UP (ref 3.5–5)
PROT SERPL-MCNC: 6.1 G/DL — SIGNIFICANT CHANGE UP (ref 6–8)
RBC # BLD: 4.54 M/UL — SIGNIFICANT CHANGE UP (ref 4.2–5.4)
RBC # FLD: 13 % — SIGNIFICANT CHANGE UP (ref 11.5–14.5)
SODIUM SERPL-SCNC: 138 MMOL/L — SIGNIFICANT CHANGE UP (ref 135–146)
WBC # BLD: 13.49 K/UL — HIGH (ref 4.8–10.8)
WBC # FLD AUTO: 13.49 K/UL — HIGH (ref 4.8–10.8)

## 2022-08-12 PROCEDURE — 93010 ELECTROCARDIOGRAM REPORT: CPT | Mod: 77

## 2022-08-12 PROCEDURE — 99291 CRITICAL CARE FIRST HOUR: CPT

## 2022-08-12 PROCEDURE — 93010 ELECTROCARDIOGRAM REPORT: CPT

## 2022-08-12 RX ORDER — NITROGLYCERIN 6.5 MG
5 CAPSULE, EXTENDED RELEASE ORAL
Qty: 50 | Refills: 0 | Status: DISCONTINUED | OUTPATIENT
Start: 2022-08-12 | End: 2022-08-12

## 2022-08-12 RX ORDER — SODIUM CHLORIDE 9 MG/ML
1000 INJECTION INTRAMUSCULAR; INTRAVENOUS; SUBCUTANEOUS
Refills: 0 | Status: DISCONTINUED | OUTPATIENT
Start: 2022-08-12 | End: 2022-08-14

## 2022-08-12 RX ADMIN — Medication 1.5 MICROGRAM(S)/MIN: at 00:03

## 2022-08-12 RX ADMIN — CHLORHEXIDINE GLUCONATE 1 APPLICATION(S): 213 SOLUTION TOPICAL at 05:56

## 2022-08-12 RX ADMIN — ATORVASTATIN CALCIUM 80 MILLIGRAM(S): 80 TABLET, FILM COATED ORAL at 21:12

## 2022-08-12 RX ADMIN — Medication 12.5 MILLIGRAM(S): at 17:05

## 2022-08-12 RX ADMIN — Medication 12.5 MILLIGRAM(S): at 06:19

## 2022-08-12 RX ADMIN — TICAGRELOR 90 MILLIGRAM(S): 90 TABLET ORAL at 05:55

## 2022-08-12 RX ADMIN — Medication 81 MILLIGRAM(S): at 07:09

## 2022-08-12 RX ADMIN — TICAGRELOR 90 MILLIGRAM(S): 90 TABLET ORAL at 17:05

## 2022-08-12 RX ADMIN — SODIUM CHLORIDE 75 MILLILITER(S): 9 INJECTION INTRAMUSCULAR; INTRAVENOUS; SUBCUTANEOUS at 00:03

## 2022-08-12 RX ADMIN — Medication 100 MICROGRAM(S): at 05:55

## 2022-08-12 NOTE — DISCHARGE NOTE PROVIDER - NSDCFUADDINST_GEN_ALL_CORE_FT
- Continue medical regimen as prescribed to prevent chest pain                   - Continue aspirin, brilinta, lipitor and metoprolol succinate                    - If you are diabetic and taking medication containing Metformin, do not take them for 48 hours after the procedure                   - Call 911 if you have chest pain, shortness of breath or bleeding from access site                   - No heavy lifting >10lbs x 1 week                   - No driving x 24 hours                   - No baths, swimming pools x 1 week, may shower                   - Low sodium low fat low cholesterol diet                   - Follow-up with Cardiologist in 1-2 weeks after discharge

## 2022-08-12 NOTE — DISCHARGE NOTE PROVIDER - HOSPITAL COURSE
This is a case of an 82 year old lady known to have HTN, dyslipidemia, CAD, and hypothyroidism, presented to the ED with chest pain of ~1-2 hours duration. Patient reports a progressively worsening central, non-radiating chest pain described as pressure that began around 10 AM, prompting call to EMS. Chest pain was associated with nausea and 2 episodes of NBNB vomiting as well as diaphoresis.   En route to the hospital, an EKG showed inferior STEMI, nitro given with moderate improvement of symptoms. Patient was asymptomatic in ED. Off note, patient reports that she had 2 episodes of chest pain on Sunday and Monday prompting her to schedule an appointment with Dr Powers on next Monday.       In the ED, code STEMI was called. She was loaded with ASA and brilinta and transfered to cath lab.    Cath showed:   Right dominant circulation.  LM: Mild luminal irregularities  LAD: Prox LAD 99% stenosis at the site of origin of D1. Distal LAD mild disease.  D1 severe disease with 70% stenosis.   CX: Distal Cx small vessel mild with  luminal irregularities.  OM1 mild disease.  RCA: Distal RCA mild disease.   % occluded culprit lesion for pt presentation s/p balloon angioplasty.    Patient was admitted to CCU for planned staged PCI to LAD on Friday 8/12/22.     Plan to DC on aspirin, brilinta,lipitor and metoprolol succinate.

## 2022-08-12 NOTE — DISCHARGE NOTE PROVIDER - NSDCMRMEDTOKEN_GEN_ALL_CORE_FT
amlodipine-benazepril 5 mg-40 mg oral capsule: 1 cap(s) orally once a day  atorvastatin 40 mg oral tablet: 1 tab(s) orally once a day  levothyroxine 100 mcg (0.1 mg) oral tablet: 1 tab(s) orally once a day  ticagrelor 90 mg oral tablet: 1 tab(s) orally 2 times a day MDD:2   Aspirin Low Dose 81 mg oral tablet, chewable: 1 tab(s) orally once a day  atorvastatin 80 mg oral tablet: 1 tab(s) orally once a day (at bedtime)  levothyroxine 100 mcg (0.1 mg) oral tablet: 1 tab(s) orally once a day  Metoprolol Succinate ER 50 mg oral tablet, extended release: 1 tab(s) orally once a day untill seen by Dr. Souza and adjusted  ticagrelor 90 mg oral tablet: 1 tab(s) orally every 12 hours

## 2022-08-12 NOTE — CHART NOTE - NSCHARTNOTEFT_GEN_A_CORE
PRE-OP DIAGNOSIS:    Staged PCI    PROCEDURE:     [x] Coronary Angiogram     [x] LHC     [] LVG     [] RHC     [] Intervention (see below)         PHYSICIAN:  Dr. Olson    ASSISTANT:  Dr CRISTAL Carey       PROCEDURE DESCRIPTION:     Consent:      [x] Patient     [] Family Member     []  Used        Anesthesia:     [] General     [x] Sedation     [x] Local        Access & Closure:     [] Fr Radial Artery     [x] 6 Fr Left Femoral Artery (Manual compression)    [] Fr Femoral Vein     [] Fr Brachial Vein       IV Contrast: 80 mL        Intervention: Successful IVUS guided PCI with ISAAC X 1 to Prox LAD 99% stenosis       Implants: 3.0 X 18 mm Xience to Prox LAD        FINDINGS:     Coronary Dominance: Right      LM: Mild luminal irregularities    LAD: Prox LAD 99% stenosis at the site of origin of D1 s/p PCI with ISAAC X 1. Distal LAD mild disease.  D1 severe disease with 70% stenosis.     Ramus: Mild luminal irregularities.    CX: Distal Cx small vessel mild with luminal irregularities.  OM1 mild disease.    RCA: Distal RCA mild disease.   RPL mild luminal irregularities         ESTIMATED BLOOD LOSS: < 10 mL        CONDITION:     [x] Good     [] Fair     [] Critical        SPECIMEN REMOVED: N/A       POST-OP DIAGNOSIS:      [x] 1 Vessel Coronary Artery Disease        PLAN OF CARE:     [x] Return to In-patient bed     [x] Medications: Aspirin, brillinta, lipitor, and metoprolol 12.5 BID ( switch to Succinate on d/c)    [x] IV Fluids: NS @ 100cc/hr for 6 hours.

## 2022-08-12 NOTE — DISCHARGE NOTE PROVIDER - CARE PROVIDER_API CALL
Jose Angel Olson)  Cardiovascular Disease; Internal Medicine  [address] [place] [address], [place] [address]  Phone: [phone]  Fax: [phone]  Follow Up Time: 2 weeks   Jose Angel Olson)  Cardiovascular Disease; Internal Medicine  [address] [place] [address]  Phone: [phone]  Fax: [phone]  Follow Up Time: 2 weeks    Julio Powers)  Cardiovascular Disease; Interventional Cardiology  [address] [place] [address]  Phone: [phone]  Fax: [phone]  Follow Up Time: 1 week

## 2022-08-12 NOTE — CHART NOTE - NSCHARTNOTEFT_GEN_A_CORE
This is a case of an 82 year old lady known to have HTN, dyslipidemia, CAD, and hypothyroidism, presented to the ED with chest pain of ~1-2 hours duration. Patient reports a progressively worsening central, non-radiating chest pain described as pressure that began around 10 AM, prompting call to EMS. Chest pain was associated with nausea and 2 episodes of NBNB vomiting as well as diaphoresis.   En route to the hospital, an EKG showed inferior STEMI, nitro given with moderate improvement of symptoms. Patient was asymptomatic in ED. Off note, patient reports that she had 2 episodes of chest pain on Sunday and Monday prompting her to schedule an appointment with Dr Powers on next Monday.       In the ED, code STEMI was called. She was loaded with ASA and brilinta and transferred to cath lab.    Cath showed:   Right dominant circulation.  LM: Mild luminal irregularities  LAD: Prox LAD 99% stenosis at the site of origin of D1. Distal LAD mild disease.  D1 severe disease with 70% stenosis.   CX: Distal Cx small vessel mild with  luminal irregularities.  OM1 mild disease.  RCA: Distal RCA mild disease.   % occluded culprit lesion for pt presentation s/p balloon angioplasty.    Patient was admitted to CCU for planned staged PCI to LAD on Friday 8/12/22.     Plan to DC on aspirin, brilinta,lipitor and metoprolol succinate. This is a case of an 82 year old lady known to have HTN, dyslipidemia, CAD, and hypothyroidism, presented to the ED with chest pain of ~1-2 hours duration. Patient reports a progressively worsening central, non-radiating chest pain described as pressure that began around 10 AM, prompting call to EMS. Chest pain was associated with nausea and 2 episodes of NBNB vomiting as well as diaphoresis.   En route to the hospital, an EKG showed inferior STEMI, nitro given with moderate improvement of symptoms. Patient was asymptomatic in ED. Off note, patient reports that she had 2 episodes of chest pain on Sunday and Monday prompting her to schedule an appointment with Dr Powers on next Monday.       In the ED, code STEMI was called. She was loaded with ASA and brilinta and transferred to cath lab.    Cath showed:   Right dominant circulation.  LM: Mild luminal irregularities  LAD: Prox LAD 99% stenosis at the site of origin of D1. Distal LAD mild disease.  D1 severe disease with 70% stenosis.   CX: Distal Cx small vessel mild with  luminal irregularities.  OM1 mild disease.  RCA: Distal RCA mild disease.   % occluded culprit lesion for pt presentation s/p balloon angioplasty.    Patient was admitted to CCU for planned staged PCI to LAD on Friday 8/12/22.     Plan to DC on aspirin, brilinta,lipitor and metoprolol succinate.    signed out to Dr Saha on 3C.

## 2022-08-12 NOTE — PROGRESS NOTE ADULT - ASSESSMENT
Assesment:   # STEMI s/p Balloon angioplasty to RPL suspected embolic source  # 2V CAD Mid LAD lesion  # New onset HFrEF (35%-40%) - euvolemic  # Hx HTN, HLD      Plan:      CARDIAC:   - Patient found to have inferior STEMI on EKG  - S/p nitro en route to the hospital  - Loaded with Aspirin and Brilinta in the ED  - Cath showed 2 vessel CAD with an RPDL 100% occlusion s/p balloon angioplasty  -s/p staged PCI today  - C/w DAPT --> ASA/brilinta  - Echo shows EF 35% with apical WMA  - lipitor 80 mg QHS  - Metoprolol 12.5mg BID  - Heparin drip and Nitro until LAD is revascularized    GI:   - DASH diet  -GI ppx 40 pantoprazole    ENDOCRINE:     - F/u AM a1c  - C/w levothyroxine 100mcg qD  - F/u AM TSH      MSK:   Activity: IAT    Heme:   DVT Prophylaxis: Lovenox 40mg qD    CHG Order  Code Status: Full  Disposition: downgrade to 3C     Assesment:   # STEMI s/p Balloon angioplasty to RPL suspected embolic source  # 2V CAD Mid LAD lesion  # New onset HFrEF (35%-40%) - euvolemic  # Hx HTN, HLD      Plan:      CARDIAC:   - Patient found to have inferior STEMI on EKG  - S/p nitro en route to the hospital  - Loaded with Aspirin and Brilinta in the ED  - Cath showed 2 vessel CAD with an RPDL 100% occlusion s/p balloon angioplasty  -s/p staged PCI 8/12/22  - C/w DAPT --> ASA/brilinta  - Echo shows EF 35% with apical WMA  - lipitor 80 mg QHS  - Metoprolol 12.5mg BID  - Heparin drip and Nitro until LAD is revascularized    GI:   - DASH diet  -GI ppx 40 pantoprazole    ENDOCRINE:     - F/u AM a1c  - C/w levothyroxine 100mcg qD  - F/u AM TSH      MSK:   Activity: IAT    Heme:   DVT Prophylaxis: Lovenox 40mg qD    CHG Order  Code Status: Full  Disposition: downgrade to 3C     Assesment:   # STEMI s/p Balloon angioplasty to RPL suspected embolic source  # 2V CAD Mid LAD lesion  # New onset HFrEF (35%-40%) - euvolemic  # Delirium  # Hx HTN, HLD      Plan:    CARDIAC:   - Patient found to have inferior STEMI on EKG  - S/p nitro en route to the hospital  - Loaded with Aspirin and Brilinta in the ED  - Cath showed 2 vessel CAD with an RPDL 100% occlusion s/p balloon angioplasty  -s/p staged PCI 8/12 to LAD  - C/w DAPT --> ASA/brilinta  - Echo shows EF 35% with apical WMA  - lipitor 80 mg QHS  - Metoprolol 12.5mg BID    GI:   - DASH diet  -GI ppx 40 pantoprazole    ENDOCRINE:     - F/u AM a1c  - C/w levothyroxine 100mcg qD  - F/u AM TSH    ID:  Follow up UA for possible cause of delirium    MSK:   Activity: IAT    Heme:   DVT Prophylaxis: Lovenox 40mg qD    CHG Order  Code Status: Full  Disposition: downgrade to 3C     Assesment:   # STEMI s/p Balloon angioplasty to RPL suspected embolic source  # 2V CAD Mid LAD severe lesion  # New onset HFrEF (35%-40%) - euvolemic  # Delirium  # Hx HTN, HLD      Plan:    CARDIAC:   - Patient found to have inferior STEMI on EKG  - S/p nitro en route to the hospital  - Loaded with Aspirin and Brilinta in the ED  - Cath showed 2 vessel CAD with an RPDL 100% occlusion s/p balloon angioplasty  -s/p staged PCI 8/12 to LAD - discussed with Dr. Olson  - C/w DAPT --> ASA/brilinta  - Echo shows EF 35% with apical WMA  - lipitor 80 mg QHS  - Metoprolol 12.5mg BID    GI:   - DASH diet  -GI ppx 40 pantoprazole    ENDOCRINE:     - F/u AM a1c  - C/w levothyroxine 100mcg qD  - F/u AM TSH    ID:  Follow up UA for possible cause of delirium    MSK:   Activity: IAT    Heme:   DVT Prophylaxis: Lovenox 40mg qD    CHG Order  Code Status: Full  Disposition: downgrade to 3C in the afternoon, if stable

## 2022-08-12 NOTE — DISCHARGE NOTE PROVIDER - NSDCQMPLAVIX_CARD_A_CORE
No, not prescribed... Double O-Z Flap Text: The defect edges were debeveled with a #15 scalpel blade.  Given the location of the defect, shape of the defect and the proximity to free margins a Double O-Z flap was deemed most appropriate.  Using a sterile surgical marker, an appropriate transposition flap was drawn incorporating the defect and placing the expected incisions within the relaxed skin tension lines where possible. The area thus outlined was incised deep to adipose tissue with a #15 scalpel blade.  The skin margins were undermined to an appropriate distance in all directions utilizing iris scissors.

## 2022-08-12 NOTE — DISCHARGE NOTE PROVIDER - PROVIDER TOKENS
PROVIDER:[TOKEN:[[id number]],FOLLOWUP:[2 weeks]] PROVIDER:[TOKEN:[[id number]],FOLLOWUP:[2 weeks]],PROVIDER:[TOKEN:[[id number]],FOLLOWUP:[1 week]]

## 2022-08-12 NOTE — PROGRESS NOTE ADULT - SUBJECTIVE AND OBJECTIVE BOX
Patient is a 82y old  Female who presents with a chief complaint of STEMI (11 Aug 2022 10:51)      HPI  This is a case of an 82 year old lady known to have HTN, dyslipidemia, CAD, and hypothyroidism, presented to the ED with chest pain of ~1-2 hours duration. Patient reports a progressively worsening central, non-radiating chest pain described as pressure that began around 10 AM, prompting call to EMS. Chest pain was associated with nausea and 2 episodes of NBNB vomiting as well as diaphoresis.   En route to the hospital, an EKG showed inferior STEMI, nitro given with moderate improvement of symptoms. Patient was asymptomatic in ED. Off note, patient reports that she had 2 episodes of chest pain on  and Monday prompting her to schedule an appointment with Dr Powers on next Monday.       In the ED, code STEMI was called. She was loaded with ASA and brilinta and transfered to cath lab.    Cath showed:   Right dominant circulation.  LM: Mild luminal irregularities  LAD: Prox LAD 99% stenosis at the site of origin of D1. Distal LAD mild disease.  D1 severe disease with 70% stenosis.   CX: Distal Cx small vessel mild with  luminal irregularities.  OM1 mild disease.  RCA: Distal RCA mild disease.   % occluded culprit lesion for pt presentation s/p balloon angioplasty.    Patient is planned for staged PCI to LAD on 22. Admit to CCU      INTERVAL HPI/OVERNIGHT EVENTS:   No overnight events   Afebrile, hemodynamically stable     Subjective:  Physical exam deferred due to pt in cath lab    ICU Vital Signs Last 24 Hrs  T(C): 36.8 (12 Aug 2022 04:00), Max: 36.8 (12 Aug 2022 04:00)  T(F): 98.2 (12 Aug 2022 04:00), Max: 98.2 (12 Aug 2022 04:00)  HR: 80 (12 Aug 2022 06:30) (78 - 106)  BP: 127/68 (12 Aug 2022 06:30) (91/58 - 157/65)  BP(mean): 91 (12 Aug 2022 06:30) (72 - 103)  ABP: --  ABP(mean): --  RR: 21 (12 Aug 2022 06:30) (17 - 43)  SpO2: 95% (12 Aug 2022 06:30) (95% - 99%)    O2 Parameters below as of 12 Aug 2022 06:30  Patient On (Oxygen Delivery Method): room air          I&O's Summary    11 Aug 2022 07:01  -  12 Aug 2022 07:00  --------------------------------------------------------  IN: 1489 mL / OUT: 920 mL / NET: 569 mL          Daily     Daily Weight in k.4 (12 Aug 2022 06:00)    Adult Advanced Hemodynamics Last 24 Hrs  CVP(mm Hg): --  CVP(cm H2O): --  CO: --  CI: --  PA: --  PA(mean): --  PCWP: --  SVR: --  SVRI: --  PVR: --  PVRI: --    EKG/Telemetry Events:    MEDICATIONS  (STANDING):  aspirin  chewable 81 milliGRAM(s) Chew daily  atorvastatin 80 milliGRAM(s) Oral at bedtime  chlorhexidine 2% Cloths 1 Application(s) Topical <User Schedule>  levothyroxine 100 MICROGram(s) Oral daily  metoprolol tartrate 12.5 milliGRAM(s) Oral every 12 hours  nitroglycerin  Infusion 5 MICROgram(s)/Min (1.5 mL/Hr) IV Continuous <Continuous>  sodium chloride 0.9%. 1000 milliLiter(s) (75 mL/Hr) IV Continuous <Continuous>  sodium chloride 0.9%. 1000 milliLiter(s) (100 mL/Hr) IV Continuous <Continuous>  ticagrelor 90 milliGRAM(s) Oral every 12 hours    MEDICATIONS  (PRN):        LABS:                        12.4   13.49 )-----------( 310      ( 12 Aug 2022 05:15 )             38.6     08-12    138  |  104  |  18  ----------------------------<  133<H>  3.9   |  23  |  0.6<L>    Ca    9.0      12 Aug 2022 05:15  Phos  2.7     08-12  Mg     2.0     08-12    TPro  6.1  /  Alb  3.6  /  TBili  0.5  /  DBili  x   /  AST  44<H>  /  ALT  31  /  AlkPhos  149<H>  08-12    LIVER FUNCTIONS - ( 12 Aug 2022 05:15 )  Alb: 3.6 g/dL / Pro: 6.1 g/dL / ALK PHOS: 149 U/L / ALT: 31 U/L / AST: 44 U/L / GGT: x           PT/INR - ( 10 Aug 2022 14:17 )   PT: 10.90 sec;   INR: 0.95 ratio         PTT - ( 12 Aug 2022 05:15 )  PTT:44.8 sec  CAPILLARY BLOOD GLUCOSE            CARDIAC MARKERS ( 10 Aug 2022 14:17 )  x     / 0.89 ng/mL / x     / x     / x                RADIOLOGY & ADDITIONAL TESTS:         Care Discussed with Consultants/Other Providers [ x] YES  [ ] NO           Patient is a 82y old  Female who presents with a chief complaint of STEMI (11 Aug 2022 10:51)      HPI  This is a case of an 82 year old lady known to have HTN, dyslipidemia, CAD, and hypothyroidism, presented to the ED with chest pain of ~1-2 hours duration. Patient reports a progressively worsening central, non-radiating chest pain described as pressure that began around 10 AM, prompting call to EMS. Chest pain was associated with nausea and 2 episodes of NBNB vomiting as well as diaphoresis.   En route to the hospital, an EKG showed inferior STEMI, nitro given with moderate improvement of symptoms. Patient was asymptomatic in ED. Off note, patient reports that she had 2 episodes of chest pain on  and Monday prompting her to schedule an appointment with Dr Powers on next Monday.       In the ED, code STEMI was called. She was loaded with ASA and brilinta and transfered to cath lab.    Cath showed:   Right dominant circulation.  LM: Mild luminal irregularities  LAD: Prox LAD 99% stenosis at the site of origin of D1. Distal LAD mild disease.  D1 severe disease with 70% stenosis.   CX: Distal Cx small vessel mild with  luminal irregularities.  OM1 mild disease.  RCA: Distal RCA mild disease.   % occluded culprit lesion for pt presentation s/p balloon angioplasty.    Patient is planned for staged PCI to LAD on 22. Admit to CCU      INTERVAL HPI/OVERNIGHT EVENTS:   No overnight events   Afebrile, hemodynamically stable   Underwent PCI to LAD today    Subjective:  Physical exam deferred due to pt in cath lab    ICU Vital Signs Last 24 Hrs  T(C): 36.8 (12 Aug 2022 04:00), Max: 36.8 (12 Aug 2022 04:00)  T(F): 98.2 (12 Aug 2022 04:00), Max: 98.2 (12 Aug 2022 04:00)  HR: 80 (12 Aug 2022 06:30) (78 - 106)  BP: 127/68 (12 Aug 2022 06:30) (91/58 - 157/65)  BP(mean): 91 (12 Aug 2022 06:30) (72 - 103)  ABP: --  ABP(mean): --  RR: 21 (12 Aug 2022 06:30) (17 - 43)  SpO2: 95% (12 Aug 2022 06:30) (95% - 99%)    O2 Parameters below as of 12 Aug 2022 06:30  Patient On (Oxygen Delivery Method): room air          I&O's Summary    11 Aug 2022 07:01  -  12 Aug 2022 07:00  --------------------------------------------------------  IN: 1489 mL / OUT: 920 mL / NET: 569 mL          Daily     Daily Weight in k.4 (12 Aug 2022 06:00)    Adult Advanced Hemodynamics Last 24 Hrs  CVP(mm Hg): --  CVP(cm H2O): --  CO: --  CI: --  PA: --  PA(mean): --  PCWP: --  SVR: --  SVRI: --  PVR: --  PVRI: --    EKG/Telemetry Events:    MEDICATIONS  (STANDING):  aspirin  chewable 81 milliGRAM(s) Chew daily  atorvastatin 80 milliGRAM(s) Oral at bedtime  chlorhexidine 2% Cloths 1 Application(s) Topical <User Schedule>  levothyroxine 100 MICROGram(s) Oral daily  metoprolol tartrate 12.5 milliGRAM(s) Oral every 12 hours  nitroglycerin  Infusion 5 MICROgram(s)/Min (1.5 mL/Hr) IV Continuous <Continuous>  sodium chloride 0.9%. 1000 milliLiter(s) (75 mL/Hr) IV Continuous <Continuous>  sodium chloride 0.9%. 1000 milliLiter(s) (100 mL/Hr) IV Continuous <Continuous>  ticagrelor 90 milliGRAM(s) Oral every 12 hours    MEDICATIONS  (PRN):        LABS:                        12.4   13.49 )-----------( 310      ( 12 Aug 2022 05:15 )             38.6     08-12    138  |  104  |  18  ----------------------------<  133<H>  3.9   |  23  |  0.6<L>    Ca    9.0      12 Aug 2022 05:15  Phos  2.7     08-12  Mg     2.0     08-12    TPro  6.1  /  Alb  3.6  /  TBili  0.5  /  DBili  x   /  AST  44<H>  /  ALT  31  /  AlkPhos  149<H>  08-12    LIVER FUNCTIONS - ( 12 Aug 2022 05:15 )  Alb: 3.6 g/dL / Pro: 6.1 g/dL / ALK PHOS: 149 U/L / ALT: 31 U/L / AST: 44 U/L / GGT: x           PT/INR - ( 10 Aug 2022 14:17 )   PT: 10.90 sec;   INR: 0.95 ratio         PTT - ( 12 Aug 2022 05:15 )  PTT:44.8 sec  CAPILLARY BLOOD GLUCOSE            CARDIAC MARKERS ( 10 Aug 2022 14:17 )  x     / 0.89 ng/mL / x     / x     / x                RADIOLOGY & ADDITIONAL TESTS:         Care Discussed with Consultants/Other Providers [ x] YES  [ ] NO           Patient is a 82y old  Female who presents with a chief complaint of STEMI (11 Aug 2022 10:51)      HPI:  This is a case of an 82 year old lady known to have HTN, dyslipidemia, CAD, and hypothyroidism, presented to the ED with chest pain of ~1-2 hours duration. Patient reports a progressively worsening central, non-radiating chest pain described as pressure that began around 10 AM, prompting call to EMS. Chest pain was associated with nausea and 2 episodes of NBNB vomiting as well as diaphoresis.   En route to the hospital, an EKG showed inferior STEMI, nitro given with moderate improvement of symptoms. Patient was asymptomatic in ED. Off note, patient reports that she had 2 episodes of chest pain on  and Monday prompting her to schedule an appointment with Dr Powers on next Monday.       In the ED, code STEMI was called. She was loaded with ASA and brilinta and transfered to cath lab.    Cath showed:   Right dominant circulation.  LM: Mild luminal irregularities  LAD: Prox LAD 99% stenosis at the site of origin of D1. Distal LAD mild disease.  D1 severe disease with 70% stenosis.   CX: Distal Cx small vessel mild with  luminal irregularities.  OM1 mild disease.  RCA: Distal RCA mild disease.   % occluded culprit lesion for pt presentation s/p balloon angioplasty.    Patient is planned for staged PCI to LAD on 22. Admit to CCU      INTERVAL HPI/OVERNIGHT EVENTS:   No overnight events   Afebrile, hemodynamically stable   Underwent complex PCI to LAD today - wean off nitro gtt    Subjective:  Physical exam deferred due to pt in cath lab    ICU Vital Signs Last 24 Hrs  T(C): 36.8 (12 Aug 2022 04:00), Max: 36.8 (12 Aug 2022 04:00)  T(F): 98.2 (12 Aug 2022 04:00), Max: 98.2 (12 Aug 2022 04:00)  HR: 80 (12 Aug 2022 06:30) (78 - 106)  BP: 127/68 (12 Aug 2022 06:30) (91/58 - 157/65)  BP(mean): 91 (12 Aug 2022 06:30) (72 - 103)  ABP: --  ABP(mean): --  RR: 21 (12 Aug 2022 06:30) (17 - 43)  SpO2: 95% (12 Aug 2022 06:30) (95% - 99%)    O2 Parameters below as of 12 Aug 2022 06:30  Patient On (Oxygen Delivery Method): room air          I&O's Summary    11 Aug 2022 07:01  -  12 Aug 2022 07:00  --------------------------------------------------------  IN: 1489 mL / OUT: 920 mL / NET: 569 mL          Daily     Daily Weight in k.4 (12 Aug 2022 06:00)    Adult Advanced Hemodynamics Last 24 Hrs  CVP(mm Hg): --  CVP(cm H2O): --  CO: --  CI: --  PA: --  PA(mean): --  PCWP: --  SVR: --  SVRI: --  PVR: --  PVRI: --    EKG/Telemetry Events:    MEDICATIONS  (STANDING):  aspirin  chewable 81 milliGRAM(s) Chew daily  atorvastatin 80 milliGRAM(s) Oral at bedtime  chlorhexidine 2% Cloths 1 Application(s) Topical <User Schedule>  levothyroxine 100 MICROGram(s) Oral daily  metoprolol tartrate 12.5 milliGRAM(s) Oral every 12 hours  nitroglycerin  Infusion 5 MICROgram(s)/Min (1.5 mL/Hr) IV Continuous <Continuous>  sodium chloride 0.9%. 1000 milliLiter(s) (75 mL/Hr) IV Continuous <Continuous>  sodium chloride 0.9%. 1000 milliLiter(s) (100 mL/Hr) IV Continuous <Continuous>  ticagrelor 90 milliGRAM(s) Oral every 12 hours    MEDICATIONS  (PRN):        LABS:                        12.4   13.49 )-----------( 310      ( 12 Aug 2022 05:15 )             38.6     08-12    138  |  104  |  18  ----------------------------<  133<H>  3.9   |  23  |  0.6<L>    Ca    9.0      12 Aug 2022 05:15  Phos  2.7     08-12  Mg     2.0     08-    TPro  6.1  /  Alb  3.6  /  TBili  0.5  /  DBili  x   /  AST  44<H>  /  ALT  31  /  AlkPhos  149<H>      LIVER FUNCTIONS - ( 12 Aug 2022 05:15 )  Alb: 3.6 g/dL / Pro: 6.1 g/dL / ALK PHOS: 149 U/L / ALT: 31 U/L / AST: 44 U/L / GGT: x           PT/INR - ( 10 Aug 2022 14:17 )   PT: 10.90 sec;   INR: 0.95 ratio         PTT - ( 12 Aug 2022 05:15 )  PTT:44.8 sec  CAPILLARY BLOOD GLUCOSE            CARDIAC MARKERS ( 10 Aug 2022 14:17 )  x     / 0.89 ng/mL / x     / x     / x                RADIOLOGY & ADDITIONAL TESTS:         Care Discussed with Consultants/Other Providers [ x] YES  [ ] NO

## 2022-08-12 NOTE — DISCHARGE NOTE PROVIDER - NSDCCPCAREPLAN_GEN_ALL_CORE_FT
PRINCIPAL DISCHARGE DIAGNOSIS  Diagnosis: Chest pain  Assessment and Plan of Treatment: Cardiac catheterization involves passing a thin flexible tube (catheter) into the right or left side of the heart. The catheter is most often inserted from the groin or the arm.   When you're in the hospital  , a catheter was inserted into an artery in your groin or arm. Then it was carefully guided up to your heart. Contrast dye was injected to see any areas in your coronary arteries that were blocked or narrowed.   A stent placed in your heart during the procedure, to alleviate the blockage.  What to Expect at Home  You may have bruising and pain in your groin or arm where the catheter was placed.   Walking short distances on a flat surface is OK. Limit going up and downstairs to around twice a day for the first 2 to 3 days.   DO NOT do any heavy pushing, exercise, or sexual activity for 2 to 5 days.   You should be able to return to work in 2 to 3 days if you DO NOT do heavy work.   DO NOT take a bath or swim for the first week. You may take showers, but please protect the incision site.   Your provider will tell you how often to change your dressing.   If your incision bleeds, lie down and put pressure on it for 30 minutes.   Many people take medicine to thin the blood to prevent a blood clot that can lead to a heart attack. Take the medications exactly as your provider tells you. DO NOT stop taking them without talking to your provider.  Call your provider if:     - There is bleeding at the catheter insertion site that does not stop when you apply pressure  - Your arm or leg below where the catheter was inserted changes color, is cool to the touch, or is numb  - The small incision for your catheter becomes red or painful, or yellow or green discharge is draining from it  - You have chest pain or shortness of breath that does not go away with rest  -   Your pulse feels irregular -- it is very slow  or very fast  - You have dizziness, fainting, fatigue, coughing blood  - You have chills or a fever over 101°F (38.3°C)         PRINCIPAL DISCHARGE DIAGNOSIS  Diagnosis: Chest pain  Assessment and Plan of Treatment: Cardiac catheterization involves passing a thin flexible tube (catheter) into the right or left side of the heart. The catheter is most often inserted from the groin or the arm.   When you're in the hospital  A catheter was inserted into an artery in your groin or arm. Then it was carefully guided up to your heart. Contrast dye was injected to see any areas in your coronary arteries that were blocked or narrowed.   A stent placed in your heart during the procedure, to alleviate the blockage.  What to Expect at Home  You may have bruising and pain in your groin or arm where the catheter was placed.   Walking short distances on a flat surface is OK. Limit going up and downstairs to around twice a day for the first 2 to 3 days.   DO NOT do any heavy pushing, exercise, or sexual activity for 2 to 5 days.   You should be able to return to work in 2 to 3 days if you DO NOT do heavy work.   DO NOT take a bath or swim for the first week. You may take showers, but please protect the incision site.   Your provider will tell you how often to change your dressing.   If your incision bleeds, lie down and put pressure on it for 30 minutes.   Many people take medicine to thin the blood to prevent a blood clot that can lead to a heart attack. Take the medications exactly as your provider tells you. DO NOT stop taking them without talking to your provider.      Continue medical regimen as prescribed to prevent chest pain                   - Continue dual anti-platelet therapy, beta blocker, statin                   - If you are diabetic and taking medication containing Metformin, do not take them for 48 hours after the procedure                   - Instructed to call 911 if chest pain, shortness of breath or bleeding from access site                   - No heavy lifting >10lbs x 1 week                   - No driving x 24 hours                   - No baths, swimming pools x 1 week, may shower                   - Low sodium low fat low cholesterol diet                   - Follow-up with Cardiologi       PRINCIPAL DISCHARGE DIAGNOSIS  Diagnosis: Chest pain  Assessment and Plan of Treatment: Cardiac catheterization involves passing a thin flexible tube (catheter) into the right or left side of the heart. The catheter is most often inserted from the groin or the arm.   When you're in the hospital  A catheter was inserted into an artery in your groin or arm. Then it was carefully guided up to your heart. Contrast dye was injected to see any areas in your coronary arteries that were blocked or narrowed.   A stent placed in your heart during the procedure, to alleviate the blockage.  What to Expect at Home  You may have bruising and pain in your groin or arm where the catheter was placed.   Walking short distances on a flat surface is OK. Limit going up and downstairs to around twice a day for the first 2 to 3 days.   DO NOT do any heavy pushing, exercise, or sexual activity for 2 to 5 days.   You should be able to return to work in 2 to 3 days if you DO NOT do heavy work.   DO NOT take a bath or swim for the first week. You may take showers, but please protect the incision site.   Your provider will tell you how often to change your dressing.   If your incision bleeds, lie down and put pressure on it for 30 minutes.   Many people take medicine to thin the blood to prevent a blood clot that can lead to a heart attack. Take the medications exactly as your provider tells you. DO NOT stop taking them without talking to your provider.      Continue medical regimen as prescribed to prevent chest pain  - Metoprolol was increased to 50mg at discharge, please follow-up with Dr. Souza and adjust medication as needed                   - Continue dual anti-platelet therapy, beta blocker, statin                   - If you are diabetic and taking medication containing Metformin, do not take them for 48 hours after the procedure                   - Instructed to call 911 if chest pain, shortness of breath or bleeding from access site                   - No heavy lifting >10lbs x 1 week                   - No driving x 24 hours                   - No baths, swimming pools x 1 wee

## 2022-08-13 LAB
ALBUMIN SERPL ELPH-MCNC: 3.4 G/DL — LOW (ref 3.5–5.2)
ALP SERPL-CCNC: 137 U/L — HIGH (ref 30–115)
ALT FLD-CCNC: 24 U/L — SIGNIFICANT CHANGE UP (ref 0–41)
ANION GAP SERPL CALC-SCNC: 10 MMOL/L — SIGNIFICANT CHANGE UP (ref 7–14)
AST SERPL-CCNC: 27 U/L — SIGNIFICANT CHANGE UP (ref 0–41)
BASOPHILS # BLD AUTO: 0.1 K/UL — SIGNIFICANT CHANGE UP (ref 0–0.2)
BASOPHILS NFR BLD AUTO: 0.8 % — SIGNIFICANT CHANGE UP (ref 0–1)
BILIRUB SERPL-MCNC: 0.7 MG/DL — SIGNIFICANT CHANGE UP (ref 0.2–1.2)
BUN SERPL-MCNC: 16 MG/DL — SIGNIFICANT CHANGE UP (ref 10–20)
CALCIUM SERPL-MCNC: 9.2 MG/DL — SIGNIFICANT CHANGE UP (ref 8.5–10.1)
CHLORIDE SERPL-SCNC: 107 MMOL/L — SIGNIFICANT CHANGE UP (ref 98–110)
CK MB CFR SERPL CALC: 4.8 NG/ML — SIGNIFICANT CHANGE UP (ref 0.6–6.3)
CO2 SERPL-SCNC: 24 MMOL/L — SIGNIFICANT CHANGE UP (ref 17–32)
CREAT SERPL-MCNC: 0.6 MG/DL — LOW (ref 0.7–1.5)
EGFR: 90 ML/MIN/1.73M2 — SIGNIFICANT CHANGE UP
EOSINOPHIL # BLD AUTO: 0.59 K/UL — SIGNIFICANT CHANGE UP (ref 0–0.7)
EOSINOPHIL NFR BLD AUTO: 4.9 % — SIGNIFICANT CHANGE UP (ref 0–8)
GLUCOSE SERPL-MCNC: 104 MG/DL — HIGH (ref 70–99)
HCT VFR BLD CALC: 35.9 % — LOW (ref 37–47)
HGB BLD-MCNC: 11.5 G/DL — LOW (ref 12–16)
IMM GRANULOCYTES NFR BLD AUTO: 0.3 % — SIGNIFICANT CHANGE UP (ref 0.1–0.3)
LYMPHOCYTES # BLD AUTO: 17.6 % — LOW (ref 20.5–51.1)
LYMPHOCYTES # BLD AUTO: 2.11 K/UL — SIGNIFICANT CHANGE UP (ref 1.2–3.4)
MAGNESIUM SERPL-MCNC: 1.9 MG/DL — SIGNIFICANT CHANGE UP (ref 1.8–2.4)
MCHC RBC-ENTMCNC: 27.8 PG — SIGNIFICANT CHANGE UP (ref 27–31)
MCHC RBC-ENTMCNC: 32 G/DL — SIGNIFICANT CHANGE UP (ref 32–37)
MCV RBC AUTO: 86.9 FL — SIGNIFICANT CHANGE UP (ref 81–99)
MONOCYTES # BLD AUTO: 1.26 K/UL — HIGH (ref 0.1–0.6)
MONOCYTES NFR BLD AUTO: 10.5 % — HIGH (ref 1.7–9.3)
NEUTROPHILS # BLD AUTO: 7.92 K/UL — HIGH (ref 1.4–6.5)
NEUTROPHILS NFR BLD AUTO: 65.9 % — SIGNIFICANT CHANGE UP (ref 42.2–75.2)
NRBC # BLD: 0 /100 WBCS — SIGNIFICANT CHANGE UP (ref 0–0)
PHOSPHATE SERPL-MCNC: 3.6 MG/DL — SIGNIFICANT CHANGE UP (ref 2.1–4.9)
PLATELET # BLD AUTO: 295 K/UL — SIGNIFICANT CHANGE UP (ref 130–400)
POTASSIUM SERPL-MCNC: 4.2 MMOL/L — SIGNIFICANT CHANGE UP (ref 3.5–5)
POTASSIUM SERPL-SCNC: 4.2 MMOL/L — SIGNIFICANT CHANGE UP (ref 3.5–5)
PROT SERPL-MCNC: 6 G/DL — SIGNIFICANT CHANGE UP (ref 6–8)
RBC # BLD: 4.13 M/UL — LOW (ref 4.2–5.4)
RBC # FLD: 13.1 % — SIGNIFICANT CHANGE UP (ref 11.5–14.5)
SODIUM SERPL-SCNC: 141 MMOL/L — SIGNIFICANT CHANGE UP (ref 135–146)
TROPONIN T SERPL-MCNC: 1.03 NG/ML — CRITICAL HIGH
WBC # BLD: 12.02 K/UL — HIGH (ref 4.8–10.8)
WBC # FLD AUTO: 12.02 K/UL — HIGH (ref 4.8–10.8)

## 2022-08-13 PROCEDURE — 93010 ELECTROCARDIOGRAM REPORT: CPT

## 2022-08-13 PROCEDURE — 99232 SBSQ HOSP IP/OBS MODERATE 35: CPT

## 2022-08-13 RX ORDER — TICAGRELOR 90 MG/1
1 TABLET ORAL
Qty: 60 | Refills: 0
Start: 2022-08-13 | End: 2022-09-11

## 2022-08-13 RX ADMIN — Medication 100 MICROGRAM(S): at 05:49

## 2022-08-13 RX ADMIN — Medication 12.5 MILLIGRAM(S): at 05:49

## 2022-08-13 RX ADMIN — TICAGRELOR 90 MILLIGRAM(S): 90 TABLET ORAL at 05:49

## 2022-08-13 RX ADMIN — ATORVASTATIN CALCIUM 80 MILLIGRAM(S): 80 TABLET, FILM COATED ORAL at 21:20

## 2022-08-13 RX ADMIN — TICAGRELOR 90 MILLIGRAM(S): 90 TABLET ORAL at 18:25

## 2022-08-13 RX ADMIN — CHLORHEXIDINE GLUCONATE 1 APPLICATION(S): 213 SOLUTION TOPICAL at 05:48

## 2022-08-13 RX ADMIN — Medication 81 MILLIGRAM(S): at 13:20

## 2022-08-13 RX ADMIN — Medication 12.5 MILLIGRAM(S): at 18:26

## 2022-08-13 NOTE — PROGRESS NOTE ADULT - ASSESSMENT
82F PMHx HTN, DLD, CAD and hypothyridism presented for chest pain. Found to have STEMI s/p RPL balloon angioplasty. Admitted to CCU pending staged procedure on Friday 8/12/22.    # Chest pain - Resolved  # STEMI  # CAD    - Patient found to inferior STEMI on EKG  - S/p nitro en route to the hospital  - Loaded with Aspirin and Brilinta in the ED  - Cath showed 2 vessel CAD with an RPDL 100% occlusion s/p balloon angioplasty  - Planned for staged procedure in 48 hours  - C/w DAPT --> ASA/brilinta  - admit to CCU  - 2d echo  - lipitor 80 mg QHS  - hold all B-blocker and AV node blocking agents    - Repeat ECG in AM  - F/u CXR  - Cardiac monitoring  - lipitor 80 mg QHS  - C/w nitro drip and contact cardiac fellow for worsening chest pain (target chest pain score 0/10. weaning parameters if no pain for SBP<120mmHg)  - F/u AM TSH, lipid profile and A1c    # Dyslipidemia  - C/w lipitor 80mg qHS  - F/u AM a1c    # HTN  - Currently on nitro-drip  - Close monitoring for HR and BP  - Holding home antihypertensive meds    # Hypothyroidism  - C/w levothyroxine 100mcg qD  - F/u AM TSH      Misc:  Diet: DASH  Activity: IAT  DVT Prophylaxis: Lovenox 40mg qD  GI Prophylaxis: pantoprazole 40mg qD  CHG Order  Code Status: Full  Disposition: admit to CCU 82F PMHx HTN, DLD, CAD and hypothyridism presented for chest pain, found to have STEMI s/p RPL balloon angioplasty. Admitted to CCU pending staged procedure on Friday 8/12/22.    #Chest pain - Resolved  #STEMI  #CAD  - Inferior STEMI on EKG  - Cath showed 2 vessel CAD with an RPDL 100% occlusion s/p balloon angioplasty  - Staged CATH done yesterday w/ ISAAC placement  - ECHO EF 35% with apical WMA  - c/w DAPT, ASA/Brilinta  - c/w lipitor 80 mg QHS  - Start Metoprolol 12.5mg BID  - hold all B-blocker and AV node blocking agents  - F/U CXR  - Repeat ECG in AM  - C/w nitro drip and contact cardiac fellow for worsening chest pain (target chest pain score 0/10. weaning parameters if no pain for SBP<120mmHg)  - F/u AM TSH, lipid profile and A1c    #Dyslipidemia  - C/w lipitor 80mg qHS  - F/u a1c    #HTN  - Currently on nitro-drip  - Close monitoring for HR and BP  - Holding home antihypertensive meds    #Hypothyroidism  - C/w levothyroxine 100mcg qD  - F/u AM TSH      Misc:  Diet: DASH  Activity: IAT  DVT Prophylaxis: Lovenox 40mg qD  GI Prophylaxis: pantoprazole 40mg qD  CHG Order  Code Status: Full  Disposition: admit to CCU 82F PMHx HTN, DLD, CAD and hypothyridism presented for chest pain, found to have STEMI s/p RPL balloon angioplasty. Admitted to CCU pending staged procedure on Friday 8/12/22.    #Chest pain - Resolved  #STEMI  #CAD  - Inferior STEMI on EKG  - Cath showed 2 vessel CAD with an RPDL 100% occlusion s/p balloon angioplasty  - Staged CATH done yesterday w/ ISAAC placement  - ECHO EF 35% with apical WMA  - c/w DAPT, ASA/Brilinta  - c/w lipitor 80 mg QHS  - c/w Metoprolol 12.5mg BID  - hold all B-blocker and AV node blocking agents  - F/U CXR  - F/U EKG  - A1c 6.1%, TSH 1.12,   - F/u lipid profile     #Dyslipidemia  - C/w lipitor 80mg qHS    #HTN  - c/w metoprolol 12.5mg BID    #Hypothyroidism  - C/w levothyroxine 100mcg qD      Misc:  DVT Prophylaxis: Lovenox 40mg qD  GI Prophylaxis: pantoprazole 40mg   Diet: DASH  Activity: IAT  Code Status: Full  Dispo: monitor in 3C Tele   82F PMHx HTN, DLD, CAD and hypothyridism presented for chest pain, found to have STEMI s/p RPL balloon angioplasty. Admitted to CCU pending staged procedure on Friday 8/12/22.    #STEMI  #2vCAD s/p PCI s/p ISAAC   - Inferior STEMI on EKG  - Cath showed 2 vessel CAD with an RPDL 100% occlusion s/p balloon angioplasty  - Staged CATH done yesterday w/ ISAAC placement  - ECHO EF 35% with apical WMA  - c/w DAPT, ASA/Brilinta  - c/w lipitor 80 mg QHS  - c/w Metoprolol 12.5mg BID  - hold all B-blocker and AV node blocking agents  - A1c 6.1%, TSH 1.12, Lipid Profile ()  - F/U EKG  - F/U Trop    #Delirium  - F/U UA for possible cause    #Dyslipidemia  - C/w lipitor 80mg qHS    #HTN  - c/w metoprolol 12.5mg BID    #Hypothyroidism  - C/w levothyroxine 100mcg qD      Misc:  DVT Prophylaxis: Lovenox 40mg qD  GI Prophylaxis: pantoprazole 40mg   Diet: DASH  Activity: IAT  Code Status: Full  Dispo: monitor in 3C Tele   82F PMHx HTN, DLD, CAD and hypothyridism presented for chest pain, found to have STEMI s/p RPL balloon angioplasty. Admitted to CCU pending staged procedure on Friday 8/12/22.    #STEMI  #2vCAD s/p PCI s/p ISAAC   - Inferior STEMI on EKG  - Cath showed 2 vessel CAD with an RPDL 100% occlusion s/p balloon angioplasty  - Staged CATH done yesterday w/ ISAAC placement  - ECHO EF 35% with apical WMA  - c/w DAPT, ASA/Brilinta  - c/w lipitor 80 mg QHS  - Increased Metoprolol to 25mg BID as per cardio  - hold all B-blocker and AV node blocking agents  - A1c 6.1%, TSH 1.12, Lipid Profile ()  - F/U EKG  - F/U Trop    #Delirium  - F/U UA for possible cause    #Dyslipidemia  - C/w lipitor 80mg qHS    #HTN  - c/w metoprolol 12.5mg BID    #Hypothyroidism  - C/w levothyroxine 100mcg qD      Misc:  DVT Prophylaxis: Lovenox 40mg qD  GI Prophylaxis: pantoprazole 40mg   Diet: DASH  Activity: IAT  Code Status: Full  Dispo: monitor in 3C Tele, can d/c tomorrow if medically cleared (cleared by cardio)

## 2022-08-13 NOTE — PROGRESS NOTE ADULT - SUBJECTIVE AND OBJECTIVE BOX
Cardiology Follow up s/p PCI RPL and LAD    BILL LUGO   82y Female  PAST MEDICAL & SURGICAL HISTORY:  Hypertension      Dyslipidemia      CAD (coronary artery disease)           HPI:  This is a case of an 82 year old lady known to have HTN, dyslipidemia, CAD, and hypothyroidism, presented to the ED with chest pain of ~1-2 hours duration. Patient reports a progressively worsening central, non-radiating chest pain described as pressure that began around 10 AM, prompting call to EMS. Chest pain was associated with nausea and 2 episodes of NBNB vomiting as well as diaphoresis.   En route to the hospital, an EKG showed inferior STEMI, nitro given with moderate improvement of symptoms. Patient was asymptomatic in ED. Off note, patient reports that she had 2 episodes of chest pain on  and Monday prompting her to schedule an appointment with Dr Powers on next Monday.       In the ED, code STEMI was called. She was loaded with ASA and brilinta and transfered to cath lab.    Cath showed:   Right dominant circulation.  LM: Mild luminal irregularities  LAD: Prox LAD 99% stenosis at the site of origin of D1. Distal LAD mild disease.  D1 severe disease with 70% stenosis.   CX: Distal Cx small vessel mild with  luminal irregularities.  OM1 mild disease.  RCA: Distal RCA mild disease.   % occluded culprit lesion for pt presentation s/p balloon angioplasty.    Patient is planned for staged PCI to LAD on 22. Admit to CCU (10 Aug 2022 16:49)    Allergies    No Known Allergies    Intolerances    pt seen and examined at bedside, s/p staged PCI pLAD on   Patient without complaints.   Denies CP, SOB, palpitations, or dizziness  Tachycardia? on telemetry overnight, pt asymptomatic    Vital Signs Last 24 Hrs  T(C): 36 (13 Aug 2022 04:00), Max: 36.5 (12 Aug 2022 16:00)  T(F): 96.8 (13 Aug 2022 04:00), Max: 97.7 (12 Aug 2022 16:00)  HR: 85 (13 Aug 2022 04:00) (72 - 92)  BP: 125/67 (13 Aug 2022 04:00) (99/59 - 168/81)  BP(mean): 105 (12 Aug 2022 22:00) (74 - 116)  RR: 18 (13 Aug 2022 04:00) (18 - 32)  SpO2: 97% (12 Aug 2022 22:00) (96% - 100%)    Parameters below as of 12 Aug 2022 22:00  Patient On (Oxygen Delivery Method): room air        MEDICATIONS  (STANDING):  aspirin  chewable 81 milliGRAM(s) Chew daily  atorvastatin 80 milliGRAM(s) Oral at bedtime  chlorhexidine 2% Cloths 1 Application(s) Topical <User Schedule>  levothyroxine 100 MICROGram(s) Oral daily  metoprolol tartrate 12.5 milliGRAM(s) Oral every 12 hours  sodium chloride 0.9%. 1000 milliLiter(s) (100 mL/Hr) IV Continuous <Continuous>  sodium chloride 0.9%. 1000 milliLiter(s) (75 mL/Hr) IV Continuous <Continuous>  ticagrelor 90 milliGRAM(s) Oral every 12 hours    MEDICATIONS  (PRN):      REVIEW OF SYSTEMS:          CONSTITUTIONAL: No weakness, fevers or chills          EYES/ENT: No visual changes;  No vertigo or throat pain           NECK: No pain or stiffness          RESPIRATORY: No cough, wheezing, hemoptysis          CARDIOVASCULAR: no pain, no SANDERSON, no palpitations           GASTROINTESTINAL: No abdominal or epigastric pain. No nausea, vomiting, or hematemesis;           GENITOURINARY: No dysuria, frequency or hematuria          NEUROLOGICAL: No numbness or weakness          SKIN: No itching, rashes    PHYSICAL EXAM:           CONSTITUTIONAL: Well-developed; well-nourished; in no acute distress  	SKIN: warm, dry  	HEAD: Normocephalic; atraumatic  	EYES: PERRL.  	ENT: No nasal discharge, airway clear, mucous membranes moist  	NECK: Supple; non tender.  	CARD: +S1, +S2, no murmurs, gallops, or rubs. (Regular) rate and rhythm    	RESP: No wheezes, rales or rhonchi. CTA B/L  	ABD: soft ntnd, + BS x 4 quadrants  	EXT: moves all extremities,  no clubbing, cyanosis or edema  	NEURO: Alert and oriented x3, no focal deficits          PSYCH: Cooperative, appropriate          VASCULAR:  + Rad / + PTs / + DPs          EXTREMITY:             Right Groin:  Dressing removed, access site soft, + pulses, no hematoma, no pain, no numbness, no signs and symptoms of infection  	   Right Radial: Dressing removed, access site soft, + pulses, no hematoma, no pain, no numbness, no signs and symptoms of infection             ECG: P                                                                                                                2D ECHO:   ACC: [id number] EXAM:  ECHO TTE WO CON COMP W DOPP                          PROCEDURE DATE:  08/10/2022          INTERPRETATION:   [address] [address]                Phone: [phone].   TRANSTHORACIC ECHOCARDIOGRAM REPORT        Patient Name:   BILL LUGO Accession #: [id number]  Medical Rec #:  [id number]        Height:      60.0 in 152.4 cm  YOB: 1940        Weight:      145.0 lb 65.77 kg  Patient Age:    82 years        BSA:         1.63 m²  Patient Gender: F               BP:          155/79 mmHg      Date of Exam:        8/10/2022 3:41:43 PM  Referring Physician: [id number] CRISTI GREENBERG  Sonographer:         Cathryn Mcfarland  Reading Physician:   William Osullivan MD, [facility].    Procedure:     2D Echo/Doppler/Color Doppler Complete and Echocardiogram   with                 Lumason Contrast.  Indications:   R07.9 - Chest Pain, unspecified  Diagnosis:     Chest pain, unspecified - R07.9  Study Details: Technically suboptimal study.        Summary:   1. Left ventricular ejection fraction, by visual estimation, is 35 to   40%.   2. Multiple left ventricular regional wall motion abnormalities exist.   See wall motion findings.   3. Moderate asymmetric left ventricular hypertrophy.   4. Spectral Doppler shows impaired relaxation pattern of left   ventricular myocardial filling (Grade I diastolic dysfunction).   5. No evidence of LV thrombus on Lumason injection.   6. Mildly enlarged left atrium.   7. Normal right atrial size.   8. Small pericardial effusion.   9. Degenerative mitral valve.  10. Moderate thickening and calcification of the anterior and posterior   mitral valve leaflets.  11. Severe mitral annular calcification.  12. Trace mitral valve regurgitation.  13. Mild tricuspid regurgitation.  14. Sclerotic aortic valve with decreased opening.  15. Mild pulmonic valve regurgitation.      LABS:                        11.5   12.02 )-----------( 295      ( 13 Aug 2022 05:57 )             35.9     08-    141  |  107  |  16  ----------------------------<  104<H>  4.2   |  24  |  0.6<L>    Ca    9.2      13 Aug 2022 05:57  Phos  3.6     -  Mg     1.9     -    TPro  6.0  /  Alb  3.4<L>  /  TBili  0.7  /  DBili  x   /  AST  27  /  ALT  24  /  AlkPhos  137<H>          Magnesium, Serum: 1.9 mg/dL [1.8 - 2.4] (22 @ 05:57)  LIVER FUNCTIONS - ( 13 Aug 2022 05:57 )  Alb: 3.4 g/dL / Pro: 6.0 g/dL / ALK PHOS: 137 U/L / ALT: 24 U/L / AST: 27 U/L / GGT: x             A/P:  I discussed the case with Cardiologist Dr. Olson and recommend the followin/11: STEMI s/p PTCA RPL  Intervention: s/p balloon angioplasty of 100% RPL lesion    Implants: None     FINDINGS:     Coronary Dominance: Right      LM: Mild luminal irregularities    LAD: Prox LAD 95% stenosis at the site of origin of D1. Distal LAD mild disease.  D1 severe disease with 70% stenosis.     CX: Distal Cx small vessel mild with  luminal irregularities.  OM1 mild disease.    RCA: Distal RCA mild disease.   % occluded s/p balloon angioplasty.      LVEDP: 36 mmHg        POST-OP DIAGNOSIS:      [x] 2 Vessel Coronary Artery Disease: (RCA;RPL and LAD)        PLAN OF CARE:     : STEMI s/p PTCA RPL  Intervention: s/p balloon angioplasty of 100% RPL lesion  : s/p staged pci pLAD ISAAC x1                      	 Continue DAPT ( Aspirin 81 mg PO Daily and  Brilinta 90mg po twice a day  ),  B-Blocker, Statin Therapy                   No ACEi/ARb at this time d/t borderline BP.  Eval for need if BP tolerates.                    Patient pharmacy called in for Brilinta  prescription and it is  free for first month, then Brilinta rx will be $200 per month until deductible is met. Pt will get more samples from office.                    Patient agreeing to take DAPT for at least one year or as directed by cardiologist                    Pt given instructions on importance of taking antiplatelet medication or risk acute stent thrombosis/death                   Post cath instructions, access site care and activity restrictions reviewed with patient                     Discussed with patient to return to hospital if experience chest pain, shortness breath, dizziness and site bleeding                   Aggressive risk factor modification, diet counseling, smoking cessation discussed with patient                       Benefits of Cardiac Rehab discussed with patient, All [facility]. Patient instructed to call and make first                               appointment after first f/u visit with Cardiologist                    Follow up with Cardiology Dr. Powers  in 1-2 weeks after discharge .  Instructed to call and make an appointment                      Discharge instructions as follows, when ready to d/c:                   - Continue medical regimen as prescribed to prevent chest pain                   - Continue dual anti-platelet therapy, beta blocker, statin                   - If you are diabetic and taking medication containing Metformin, do not take them for 48 hours after the procedure                   - Instructed to call 911 if chest pain, shortness of breath or bleeding from access site                   - No heavy lifting >10lbs x 1 week                   - No driving x 24 hours                   - No baths, swimming pools x 1 week, may shower                   - Low sodium low fat low cholesterol diet                   - Follow-up with Cardiologist in 1-2 weeks after discharge                     Cardiology Follow up s/p PCI RPL and LAD    BILL LUGO   82y Female  PAST MEDICAL & SURGICAL HISTORY:  Hypertension      Dyslipidemia      CAD (coronary artery disease)           HPI:  This is a case of an 82 year old lady known to have HTN, dyslipidemia, CAD, and hypothyroidism, presented to the ED with chest pain of ~1-2 hours duration. Patient reports a progressively worsening central, non-radiating chest pain described as pressure that began around 10 AM, prompting call to EMS. Chest pain was associated with nausea and 2 episodes of NBNB vomiting as well as diaphoresis.   En route to the hospital, an EKG showed inferior STEMI, nitro given with moderate improvement of symptoms. Patient was asymptomatic in ED. Off note, patient reports that she had 2 episodes of chest pain on  and Monday prompting her to schedule an appointment with Dr Powers on next Monday.       In the ED, code STEMI was called. She was loaded with ASA and brilinta and transfered to cath lab.    Cath showed:   Right dominant circulation.  LM: Mild luminal irregularities  LAD: Prox LAD 99% stenosis at the site of origin of D1. Distal LAD mild disease.  D1 severe disease with 70% stenosis.   CX: Distal Cx small vessel mild with  luminal irregularities.  OM1 mild disease.  RCA: Distal RCA mild disease.   % occluded culprit lesion for pt presentation s/p balloon angioplasty.    Patient is planned for staged PCI to LAD on 22. Admit to CCU (10 Aug 2022 16:49)    Allergies    No Known Allergies    Intolerances    pt seen and examined at bedside, s/p staged PCI pLAD on   Patient without complaints.   Denies CP, SOB, palpitations, or dizziness  Tachycardia? on telemetry overnight, pt asymptomatic    Vital Signs Last 24 Hrs  T(C): 36 (13 Aug 2022 04:00), Max: 36.5 (12 Aug 2022 16:00)  T(F): 96.8 (13 Aug 2022 04:00), Max: 97.7 (12 Aug 2022 16:00)  HR: 85 (13 Aug 2022 04:00) (72 - 92)  BP: 125/67 (13 Aug 2022 04:00) (99/59 - 168/81)  BP(mean): 105 (12 Aug 2022 22:00) (74 - 116)  RR: 18 (13 Aug 2022 04:00) (18 - 32)  SpO2: 97% (12 Aug 2022 22:00) (96% - 100%)    Parameters below as of 12 Aug 2022 22:00  Patient On (Oxygen Delivery Method): room air        MEDICATIONS  (STANDING):  aspirin  chewable 81 milliGRAM(s) Chew daily  atorvastatin 80 milliGRAM(s) Oral at bedtime  chlorhexidine 2% Cloths 1 Application(s) Topical <User Schedule>  levothyroxine 100 MICROGram(s) Oral daily  metoprolol tartrate 12.5 milliGRAM(s) Oral every 12 hours  sodium chloride 0.9%. 1000 milliLiter(s) (100 mL/Hr) IV Continuous <Continuous>  sodium chloride 0.9%. 1000 milliLiter(s) (75 mL/Hr) IV Continuous <Continuous>  ticagrelor 90 milliGRAM(s) Oral every 12 hours    MEDICATIONS  (PRN):      REVIEW OF SYSTEMS:          CONSTITUTIONAL: No weakness, fevers or chills          EYES/ENT: No visual changes;  No vertigo or throat pain           NECK: No pain or stiffness          RESPIRATORY: No cough, wheezing, hemoptysis          CARDIOVASCULAR: no pain, no SANDERSON, no palpitations           GASTROINTESTINAL: No abdominal or epigastric pain. No nausea, vomiting, or hematemesis;           GENITOURINARY: No dysuria, frequency or hematuria          NEUROLOGICAL: No numbness or weakness          SKIN: No itching, rashes    PHYSICAL EXAM:           CONSTITUTIONAL: Well-developed; well-nourished; in no acute distress  	SKIN: warm, dry  	HEAD: Normocephalic; atraumatic  	EYES: PERRL.  	ENT: No nasal discharge, airway clear, mucous membranes moist  	NECK: Supple; non tender.  	CARD: +S1, +S2, no murmurs, gallops, or rubs. (Regular) rate and rhythm    	RESP: No wheezes, rales or rhonchi. CTA B/L  	ABD: soft ntnd, + BS x 4 quadrants  	EXT: moves all extremities,  no clubbing, cyanosis or edema  	NEURO: Alert and oriented x3, no focal deficits          PSYCH: Cooperative, appropriate          VASCULAR:  + Rad / + PTs / + DPs          EXTREMITY:             Right Groin:  Dressing removed, access site soft, + pulses, no hematoma, no pain, no numbness, no signs and symptoms of infection  	   Right Radial: Dressing removed, access site soft, + pulses, no hematoma, no pain, no numbness, no signs and symptoms of infection             ECG: P                                                                                                                2D ECHO:   ACC: [id number] EXAM:  ECHO TTE WO CON COMP W DOPP                          PROCEDURE DATE:  08/10/2022          INTERPRETATION:   [address] [address]                Phone: [phone].   TRANSTHORACIC ECHOCARDIOGRAM REPORT        Patient Name:   BILL LUGO Accession #: [id number]  Medical Rec #:  [id number]        Height:      60.0 in 152.4 cm  YOB: 1940        Weight:      145.0 lb 65.77 kg  Patient Age:    82 years        BSA:         1.63 m²  Patient Gender: F               BP:          155/79 mmHg      Date of Exam:        8/10/2022 3:41:43 PM  Referring Physician: [id number] CRISTI GREENBERG  Sonographer:         Cathryn Mcfarland  Reading Physician:   William Osullivan MD, [facility].    Procedure:     2D Echo/Doppler/Color Doppler Complete and Echocardiogram   with                 Lumason Contrast.  Indications:   R07.9 - Chest Pain, unspecified  Diagnosis:     Chest pain, unspecified - R07.9  Study Details: Technically suboptimal study.        Summary:   1. Left ventricular ejection fraction, by visual estimation, is 35 to   40%.   2. Multiple left ventricular regional wall motion abnormalities exist.   See wall motion findings.   3. Moderate asymmetric left ventricular hypertrophy.   4. Spectral Doppler shows impaired relaxation pattern of left   ventricular myocardial filling (Grade I diastolic dysfunction).   5. No evidence of LV thrombus on Lumason injection.   6. Mildly enlarged left atrium.   7. Normal right atrial size.   8. Small pericardial effusion.   9. Degenerative mitral valve.  10. Moderate thickening and calcification of the anterior and posterior   mitral valve leaflets.  11. Severe mitral annular calcification.  12. Trace mitral valve regurgitation.  13. Mild tricuspid regurgitation.  14. Sclerotic aortic valve with decreased opening.  15. Mild pulmonic valve regurgitation.      LABS:                        11.5   12.02 )-----------( 295      ( 13 Aug 2022 05:57 )             35.9     08-    141  |  107  |  16  ----------------------------<  104<H>  4.2   |  24  |  0.6<L>    Ca    9.2      13 Aug 2022 05:57  Phos  3.6     -  Mg     1.9     -    TPro  6.0  /  Alb  3.4<L>  /  TBili  0.7  /  DBili  x   /  AST  27  /  ALT  24  /  AlkPhos  137<H>          Magnesium, Serum: 1.9 mg/dL [1.8 - 2.4] (22 @ 05:57)  LIVER FUNCTIONS - ( 13 Aug 2022 05:57 )  Alb: 3.4 g/dL / Pro: 6.0 g/dL / ALK PHOS: 137 U/L / ALT: 24 U/L / AST: 27 U/L / GGT: x             A/P:  I discussed the case with Cardiologist Dr. Olson and recommend the followin/11: STEMI s/p PTCA RPL  Intervention: s/p balloon angioplasty of 100% RPL lesion    Implants: None     FINDINGS:     Coronary Dominance: Right      LM: Mild luminal irregularities    LAD: Prox LAD 95% stenosis at the site of origin of D1. Distal LAD mild disease.  D1 severe disease with 70% stenosis.     CX: Distal Cx small vessel mild with  luminal irregularities.  OM1 mild disease.    RCA: Distal RCA mild disease.   % occluded s/p balloon angioplasty.      LVEDP: 36 mmHg        POST-OP DIAGNOSIS:      [x] 2 Vessel Coronary Artery Disease: (RCA;RPL and LAD)        PLAN OF CARE:     : STEMI s/p PTCA RPL  Intervention: s/p balloon angioplasty of 100% RPL lesion  : s/p staged pci pLAD ISAAC x1                      	 Continue DAPT ( Aspirin 81 mg PO Daily and  Brilinta 90mg po twice a day  ),  B-Blocker, Statin Therapy                   No ACEi/ARb at this time d/t labile BP, reevaluate daily                    Patient pharmacy called in for Brilinta  prescription and it is  free for first month, then Brilinta rx will be $200 per month until deductible is met. Pt will get more samples from office.                    Patient agreeing to take DAPT for at least one year or as directed by cardiologist                    Pt given instructions on importance of taking antiplatelet medication or risk acute stent thrombosis/death                   Post cath instructions, access site care and activity restrictions reviewed with patient                     Discussed with patient to return to hospital if experience chest pain, shortness breath, dizziness and site bleeding                   Aggressive risk factor modification, diet counseling, smoking cessation discussed with patient                       Benefits of Cardiac Rehab discussed with patient, All documents sent to [facility]. Patient instructed to call and make first                               appointment after first f/u visit with Cardiologist                    Follow up with Cardiology Dr. Powers  in 1-2 weeks after discharge .  Instructed to call and make an appointment                      Discharge instructions as follows, when ready to d/c:                   - Continue medical regimen as prescribed to prevent chest pain                   - Continue dual anti-platelet therapy, beta blocker, statin                   - If you are diabetic and taking medication containing Metformin, do not take them for 48 hours after the procedure                   - Instructed to call 911 if chest pain, shortness of breath or bleeding from access site                   - No heavy lifting >10lbs x 1 week                   - No driving x 24 hours                   - No baths, swimming pools x 1 week, may shower                   - Low sodium low fat low cholesterol diet                   - Follow-up with Cardiologist in 1-2 weeks after discharge                     Cardiology Follow up s/p PCI RPL and LAD    BILL LUGO   82y Female  PAST MEDICAL & SURGICAL HISTORY:  Hypertension      Dyslipidemia      CAD (coronary artery disease)           HPI:  This is a case of an 82 year old lady known to have HTN, dyslipidemia, CAD, and hypothyroidism, presented to the ED with chest pain of ~1-2 hours duration. Patient reports a progressively worsening central, non-radiating chest pain described as pressure that began around 10 AM, prompting call to EMS. Chest pain was associated with nausea and 2 episodes of NBNB vomiting as well as diaphoresis.   En route to the hospital, an EKG showed inferior STEMI, nitro given with moderate improvement of symptoms. Patient was asymptomatic in ED. Off note, patient reports that she had 2 episodes of chest pain on  and Monday prompting her to schedule an appointment with Dr Powers on next Monday.       In the ED, code STEMI was called. She was loaded with ASA and brilinta and transfered to cath lab.    Cath showed:   Right dominant circulation.  LM: Mild luminal irregularities  LAD: Prox LAD 99% stenosis at the site of origin of D1. Distal LAD mild disease.  D1 severe disease with 70% stenosis.   CX: Distal Cx small vessel mild with  luminal irregularities.  OM1 mild disease.  RCA: Distal RCA mild disease.   % occluded culprit lesion for pt presentation s/p balloon angioplasty.    Patient is planned for staged PCI to LAD on 22. Admit to CCU (10 Aug 2022 16:49)    Allergies    No Known Allergies    Intolerances    pt seen and examined at bedside, s/p staged PCI pLAD on   Patient without complaints.   Denies CP, SOB, palpitations, or dizziness  NSVT on telemetry overnight, pt asymptomatic    Vital Signs Last 24 Hrs  T(C): 36 (13 Aug 2022 04:00), Max: 36.5 (12 Aug 2022 16:00)  T(F): 96.8 (13 Aug 2022 04:00), Max: 97.7 (12 Aug 2022 16:00)  HR: 85 (13 Aug 2022 04:00) (72 - 92)  BP: 125/67 (13 Aug 2022 04:00) (99/59 - 168/81)  BP(mean): 105 (12 Aug 2022 22:00) (74 - 116)  RR: 18 (13 Aug 2022 04:00) (18 - 32)  SpO2: 97% (12 Aug 2022 22:00) (96% - 100%)    Parameters below as of 12 Aug 2022 22:00  Patient On (Oxygen Delivery Method): room air        MEDICATIONS  (STANDING):  aspirin  chewable 81 milliGRAM(s) Chew daily  atorvastatin 80 milliGRAM(s) Oral at bedtime  chlorhexidine 2% Cloths 1 Application(s) Topical <User Schedule>  levothyroxine 100 MICROGram(s) Oral daily  metoprolol tartrate 12.5 milliGRAM(s) Oral every 12 hours  sodium chloride 0.9%. 1000 milliLiter(s) (100 mL/Hr) IV Continuous <Continuous>  sodium chloride 0.9%. 1000 milliLiter(s) (75 mL/Hr) IV Continuous <Continuous>  ticagrelor 90 milliGRAM(s) Oral every 12 hours    MEDICATIONS  (PRN):      REVIEW OF SYSTEMS:          CONSTITUTIONAL: No weakness, fevers or chills          EYES/ENT: No visual changes;  No vertigo or throat pain           NECK: No pain or stiffness          RESPIRATORY: No cough, wheezing, hemoptysis          CARDIOVASCULAR: no pain, no SANDERSON, no palpitations           GASTROINTESTINAL: No abdominal or epigastric pain. No nausea, vomiting, or hematemesis;           GENITOURINARY: No dysuria, frequency or hematuria          NEUROLOGICAL: No numbness or weakness          SKIN: No itching, rashes    PHYSICAL EXAM:           CONSTITUTIONAL: Well-developed; well-nourished; in no acute distress  	SKIN: warm, dry  	HEAD: Normocephalic; atraumatic  	EYES: PERRL.  	ENT: No nasal discharge, airway clear, mucous membranes moist  	NECK: Supple; non tender.  	CARD: +S1, +S2, no murmurs, gallops, or rubs. (Regular) rate and rhythm    	RESP: No wheezes, rales or rhonchi. CTA B/L  	ABD: soft ntnd, + BS x 4 quadrants  	EXT: moves all extremities,  no clubbing, cyanosis or edema  	NEURO: Alert and oriented x3, no focal deficits          PSYCH: Cooperative, appropriate          VASCULAR:  + Rad / + PTs / + DPs          EXTREMITY:             Right Groin:  Dressing removed, access site soft, + pulses, no hematoma, no pain, no numbness, no signs and symptoms of infection  	   Right Radial: Dressing removed, access site soft, + pulses, no hematoma, no pain, no numbness, no signs and symptoms of infection             ECG: P                                                                                                                2D ECHO:   ACC: [id number] EXAM:  ECHO TTE WO CON COMP W DOPP                          PROCEDURE DATE:  08/10/2022          INTERPRETATION:   [address] [address]                Phone: [phone].   TRANSTHORACIC ECHOCARDIOGRAM REPORT        Patient Name:   BILL LUGO Accession #: [id number]  Medical Rec #:  [id number]        Height:      60.0 in 152.4 cm  YOB: 1940        Weight:      145.0 lb 65.77 kg  Patient Age:    82 years        BSA:         1.63 m²  Patient Gender: F               BP:          155/79 mmHg      Date of Exam:        8/10/2022 3:41:43 PM  Referring Physician: [id number] CRISTI GREENBERG  Sonographer:         Cathryn Mcfarland  Reading Physician:   William Oslulivan MD, [facility].    Procedure:     2D Echo/Doppler/Color Doppler Complete and Echocardiogram   with                 Lumason Contrast.  Indications:   R07.9 - Chest Pain, unspecified  Diagnosis:     Chest pain, unspecified - R07.9  Study Details: Technically suboptimal study.        Summary:   1. Left ventricular ejection fraction, by visual estimation, is 35 to   40%.   2. Multiple left ventricular regional wall motion abnormalities exist.   See wall motion findings.   3. Moderate asymmetric left ventricular hypertrophy.   4. Spectral Doppler shows impaired relaxation pattern of left   ventricular myocardial filling (Grade I diastolic dysfunction).   5. No evidence of LV thrombus on Lumason injection.   6. Mildly enlarged left atrium.   7. Normal right atrial size.   8. Small pericardial effusion.   9. Degenerative mitral valve.  10. Moderate thickening and calcification of the anterior and posterior   mitral valve leaflets.  11. Severe mitral annular calcification.  12. Trace mitral valve regurgitation.  13. Mild tricuspid regurgitation.  14. Sclerotic aortic valve with decreased opening.  15. Mild pulmonic valve regurgitation.      LABS:                        11.5   12.02 )-----------( 295      ( 13 Aug 2022 05:57 )             35.9     08-    141  |  107  |  16  ----------------------------<  104<H>  4.2   |  24  |  0.6<L>    Ca    9.2      13 Aug 2022 05:57  Phos  3.6     -  Mg     1.9     -    TPro  6.0  /  Alb  3.4<L>  /  TBili  0.7  /  DBili  x   /  AST  27  /  ALT  24  /  AlkPhos  137<H>          Magnesium, Serum: 1.9 mg/dL [1.8 - 2.4] (22 @ 05:57)  LIVER FUNCTIONS - ( 13 Aug 2022 05:57 )  Alb: 3.4 g/dL / Pro: 6.0 g/dL / ALK PHOS: 137 U/L / ALT: 24 U/L / AST: 27 U/L / GGT: x             A/P:  I discussed the case with Cardiologist Dr. Olson and recommend the followin/11: STEMI s/p PTCA RPL  Intervention: s/p balloon angioplasty of 100% RPL lesion    Implants: None     FINDINGS:     Coronary Dominance: Right      LM: Mild luminal irregularities    LAD: Prox LAD 95% stenosis at the site of origin of D1. Distal LAD mild disease.  D1 severe disease with 70% stenosis.     CX: Distal Cx small vessel mild with  luminal irregularities.  OM1 mild disease.    RCA: Distal RCA mild disease.   % occluded s/p balloon angioplasty.      LVEDP: 36 mmHg        POST-OP DIAGNOSIS:      [x] 2 Vessel Coronary Artery Disease: (RCA;RPL and LAD)        PLAN OF CARE:     : STEMI s/p PTCA RPL  Intervention: s/p balloon angioplasty of 100% RPL lesion  : s/p staged pci pLAD ISAAC x1                      	 Continue DAPT ( Aspirin 81 mg PO Daily and  Brilinta 90mg po twice a day  ),  B-Blocker, Statin Therapy                   increase Metoprolol to 25mg po q12 per Dr. Osullivan for NSVT                   No ACEi/ARb at this time d/t labile BP, reevaluate daily                    Patient pharmacy called in for Brilinta  prescription and it is  free for first month, then Brilinta rx will be $200 per month until deductible is met. Pt will get more samples from office.                    Patient agreeing to take DAPT for at least one year or as directed by cardiologist                    Pt given instructions on importance of taking antiplatelet medication or risk acute stent thrombosis/death                   Post cath instructions, access site care and activity restrictions reviewed with patient                     Discussed with patient to return to hospital if experience chest pain, shortness breath, dizziness and site bleeding                   Aggressive risk factor modification, diet counseling, smoking cessation discussed with patient                       Benefits of Cardiac Rehab discussed with patient, All [facility]. Patient instructed to call and make first                               appointment after first f/u visit with Cardiologist                    Follow up with Cardiology Dr. Powers  in 1-2 weeks after discharge .  Instructed to call and make an appointment                      Discharge instructions as follows, when ready to d/c:                   - Continue medical regimen as prescribed to prevent chest pain                   - Continue dual anti-platelet therapy, beta blocker, statin                   - If you are diabetic and taking medication containing Metformin, do not take them for 48 hours after the procedure                   - Instructed to call 911 if chest pain, shortness of breath or bleeding from access site                   - No heavy lifting >10lbs x 1 week                   - No driving x 24 hours                   - No baths, swimming pools x 1 week, may shower                   - Low sodium low fat low cholesterol diet                   - Follow-up with Cardiologist in 1-2 weeks after discharge                     Cardiology Follow up s/p PCI RPL and LAD    BILL LUGO   82y Female  PAST MEDICAL & SURGICAL HISTORY:    Hypertension      Dyslipidemia      CAD (coronary artery disease)           HPI:  This is a case of an 82 year old lady known to have HTN, dyslipidemia, CAD, and hypothyroidism, presented to the ED with chest pain of ~1-2 hours duration. Patient reports a progressively worsening central, non-radiating chest pain described as pressure that began around 10 AM, prompting call to EMS. Chest pain was associated with nausea and 2 episodes of NBNB vomiting as well as diaphoresis.   En route to the hospital, an EKG showed inferior STEMI, nitro given with moderate improvement of symptoms. Patient was asymptomatic in ED. Off note, patient reports that she had 2 episodes of chest pain on  and Monday prompting her to schedule an appointment with Dr Powers on next Monday.       In the ED, code STEMI was called. She was loaded with ASA and brilinta and transfered to cath lab.    Cath showed:   Right dominant circulation.  LM: Mild luminal irregularities  LAD: Prox LAD 99% stenosis at the site of origin of D1. Distal LAD mild disease.  D1 severe disease with 70% stenosis.   CX: Distal Cx small vessel mild with  luminal irregularities.  OM1 mild disease.  RCA: Distal RCA mild disease.   % occluded culprit lesion for pt presentation s/p balloon angioplasty.    Patient is planned for staged PCI to LAD on 22. Admit to CCU (10 Aug 2022 16:49)    Allergies    No Known Allergies    Intolerances    pt seen and examined at bedside, s/p staged PCI pLAD on   Patient without complaints.   Denies CP, SOB, palpitations, or dizziness  NSVT on telemetry overnight, pt asymptomatic    Vital Signs Last 24 Hrs  T(C): 36 (13 Aug 2022 04:00), Max: 36.5 (12 Aug 2022 16:00)  T(F): 96.8 (13 Aug 2022 04:00), Max: 97.7 (12 Aug 2022 16:00)  HR: 85 (13 Aug 2022 04:00) (72 - 92)  BP: 125/67 (13 Aug 2022 04:00) (99/59 - 168/81)  BP(mean): 105 (12 Aug 2022 22:00) (74 - 116)  RR: 18 (13 Aug 2022 04:00) (18 - 32)  SpO2: 97% (12 Aug 2022 22:00) (96% - 100%)    Parameters below as of 12 Aug 2022 22:00  Patient On (Oxygen Delivery Method): room air        MEDICATIONS  (STANDING):  aspirin  chewable 81 milliGRAM(s) Chew daily  atorvastatin 80 milliGRAM(s) Oral at bedtime  chlorhexidine 2% Cloths 1 Application(s) Topical <User Schedule>  levothyroxine 100 MICROGram(s) Oral daily  metoprolol tartrate 12.5 milliGRAM(s) Oral every 12 hours  sodium chloride 0.9%. 1000 milliLiter(s) (100 mL/Hr) IV Continuous <Continuous>  sodium chloride 0.9%. 1000 milliLiter(s) (75 mL/Hr) IV Continuous <Continuous>  ticagrelor 90 milliGRAM(s) Oral every 12 hours    MEDICATIONS  (PRN):      REVIEW OF SYSTEMS:          CONSTITUTIONAL: No weakness, fevers or chills          EYES/ENT: No visual changes;  No vertigo or throat pain           NECK: No pain or stiffness          RESPIRATORY: No cough, wheezing, hemoptysis          CARDIOVASCULAR: no pain, no SANDERSON, no palpitations           GASTROINTESTINAL: No abdominal or epigastric pain. No nausea, vomiting, or hematemesis;           GENITOURINARY: No dysuria, frequency or hematuria          NEUROLOGICAL: No numbness or weakness          SKIN: No itching, rashes    PHYSICAL EXAM:           CONSTITUTIONAL: Well-developed; well-nourished; in no acute distress  	SKIN: warm, dry  	HEAD: Normocephalic; atraumatic  	EYES: PERRL.  	ENT: No nasal discharge, airway clear, mucous membranes moist  	NECK: Supple; non tender.  	CARD: +S1, +S2, no murmurs, gallops, or rubs. (Regular) rate and rhythm    	RESP: No wheezes, rales or rhonchi. CTA B/L  	ABD: soft ntnd, + BS x 4 quadrants  	EXT: moves all extremities,  no clubbing, cyanosis or edema  	NEURO: Alert and oriented x3, no focal deficits          PSYCH: Cooperative, appropriate          VASCULAR:  + Rad / + PTs / + DPs          EXTREMITY:             Right Groin:  Dressing removed, access site soft, + pulses, no hematoma, no pain, no numbness, no signs and symptoms of infection  	   Right Radial: Dressing removed, access site soft, + pulses, no hematoma, no pain, no numbness, no signs and symptoms of infection             ECG: P                                                                                                                2D ECHO:   ACC: [id number] EXAM:  ECHO TTE WO CON COMP W DOPP                          PROCEDURE DATE:  08/10/2022          INTERPRETATION:   [address] [address]                Phone: [phone].   TRANSTHORACIC ECHOCARDIOGRAM REPORT        Patient Name:   BILL LUGO Accession #: [id number]  Medical Rec #:  [id number]        Height:      60.0 in 152.4 cm  YOB: 1940        Weight:      145.0 lb 65.77 kg  Patient Age:    82 years        BSA:         1.63 m²  Patient Gender: F               BP:          155/79 mmHg      Date of Exam:        8/10/2022 3:41:43 PM  Referring Physician: [id number] CRISTI GREENBERG  Sonographer:         Cathryn Mcfarland  Reading Physician:   William Osullivan MD, [facility].    Procedure:     2D Echo/Doppler/Color Doppler Complete and Echocardiogram   with                 Lumason Contrast.  Indications:   R07.9 - Chest Pain, unspecified  Diagnosis:     Chest pain, unspecified - R07.9  Study Details: Technically suboptimal study.        Summary:   1. Left ventricular ejection fraction, by visual estimation, is 35 to   40%.   2. Multiple left ventricular regional wall motion abnormalities exist.   See wall motion findings.   3. Moderate asymmetric left ventricular hypertrophy.   4. Spectral Doppler shows impaired relaxation pattern of left   ventricular myocardial filling (Grade I diastolic dysfunction).   5. No evidence of LV thrombus on Lumason injection.   6. Mildly enlarged left atrium.   7. Normal right atrial size.   8. Small pericardial effusion.   9. Degenerative mitral valve.  10. Moderate thickening and calcification of the anterior and posterior   mitral valve leaflets.  11. Severe mitral annular calcification.  12. Trace mitral valve regurgitation.  13. Mild tricuspid regurgitation.  14. Sclerotic aortic valve with decreased opening.  15. Mild pulmonic valve regurgitation.      LABS:                        11.5   12.02 )-----------( 295      ( 13 Aug 2022 05:57 )             35.9     08-    141  |  107  |  16  ----------------------------<  104<H>  4.2   |  24  |  0.6<L>    Ca    9.2      13 Aug 2022 05:57  Phos  3.6     -  Mg     1.9     -    TPro  6.0  /  Alb  3.4<L>  /  TBili  0.7  /  DBili  x   /  AST  27  /  ALT  24  /  AlkPhos  137<H>          Magnesium, Serum: 1.9 mg/dL [1.8 - 2.4] (22 @ 05:57)  LIVER FUNCTIONS - ( 13 Aug 2022 05:57 )  Alb: 3.4 g/dL / Pro: 6.0 g/dL / ALK PHOS: 137 U/L / ALT: 24 U/L / AST: 27 U/L / GGT: x             A/P:  I discussed the case with Cardiologist Dr. Olson and recommend the followin/11: STEMI s/p PTCA RPL  Intervention: s/p balloon angioplasty of 100% RPL lesion    Implants: None     FINDINGS:     Coronary Dominance: Right      LM: Mild luminal irregularities    LAD: Prox LAD 95% stenosis at the site of origin of D1. Distal LAD mild disease.  D1 severe disease with 70% stenosis.     CX: Distal Cx small vessel mild with  luminal irregularities.  OM1 mild disease.    RCA: Distal RCA mild disease.   % occluded s/p balloon angioplasty.      LVEDP: 36 mmHg        POST-OP DIAGNOSIS:      [x] 2 Vessel Coronary Artery Disease: (RCA;RPL and LAD)        PLAN OF CARE:     : STEMI s/p PTCA RPL  Intervention: s/p balloon angioplasty of 100% RPL lesion  : s/p staged pci pLAD ISAAC x1                      	 Continue DAPT ( Aspirin 81 mg PO Daily and  Brilinta 90mg po twice a day  ),  B-Blocker, Statin Therapy                   increase Metoprolol to 25mg po q12 per Dr. Osullivan for NSVT                   No ACEi/ARb at this time d/t labile BP, reevaluate daily                    Patient pharmacy called in for Brilinta  prescription and it is  free for first month, then Brilinta rx will be $200 per month until deductible is met. Pt will get more samples from office.                    Patient agreeing to take DAPT for at least one year or as directed by cardiologist                    Pt given instructions on importance of taking antiplatelet medication or risk acute stent thrombosis/death                   Post cath instructions, access site care and activity restrictions reviewed with patient                     Discussed with patient to return to hospital if experience chest pain, shortness breath, dizziness and site bleeding                   Aggressive risk factor modification, diet counseling, smoking cessation discussed with patient                       Benefits of Cardiac Rehab discussed with patient, All documents sent to [facility]. Patient instructed to call and make first                               appointment after first f/u visit with Cardiologist                    Follow up with Cardiology Dr. Powers  next week (has appointment)                     Discharge instructions as follows, when ready to d/c:                   - Continue medical regimen as prescribed to prevent chest pain                   - Continue dual anti-platelet therapy, beta blocker, statin                   - If you are diabetic and taking medication containing Metformin, do not take them for 48 hours after the procedure                   - Instructed to call 911 if chest pain, shortness of breath or bleeding from access site                   - No heavy lifting >10lbs x 1 week                   - No driving x 24 hours                   - No baths, swimming pools x 1 week, may shower                   - Low sodium low fat low cholesterol diet                   - Follow-up with Cardiologist in 1-2 weeks after discharge

## 2022-08-13 NOTE — PROGRESS NOTE ADULT - SUBJECTIVE AND OBJECTIVE BOX
SUBJECTIVE:    Patient is a 82y old Female who presents with a chief complaint of STEMI (13 Aug 2022 10:14)    Currently admitted to medicine with the primary diagnosis of:    Today is hospital day 3d.     Overnight Events:     No significant events overnight    PAST MEDICAL & SURGICAL HISTORY  Hypertension    Dyslipidemia    CAD (coronary artery disease)      SOCIAL HISTORY:  No significant social hx    ALLERGIES:  No Known Allergies    MEDICATIONS:  STANDING MEDICATIONS  aspirin  chewable 81 milliGRAM(s) Chew daily  atorvastatin 80 milliGRAM(s) Oral at bedtime  chlorhexidine 2% Cloths 1 Application(s) Topical <User Schedule>  levothyroxine 100 MICROGram(s) Oral daily  metoprolol tartrate 12.5 milliGRAM(s) Oral every 12 hours  sodium chloride 0.9%. 1000 milliLiter(s) IV Continuous <Continuous>  sodium chloride 0.9%. 1000 milliLiter(s) IV Continuous <Continuous>  ticagrelor 90 milliGRAM(s) Oral every 12 hours    PRN MEDICATIONS    VITALS:   ICU Vital Signs Last 24 Hrs  T(C): 36 (13 Aug 2022 04:00), Max: 36.5 (12 Aug 2022 16:00)  T(F): 96.8 (13 Aug 2022 04:00), Max: 97.7 (12 Aug 2022 16:00)  HR: 85 (13 Aug 2022 04:00) (72 - 92)  BP: 125/67 (13 Aug 2022 04:00) (99/59 - 168/81)  BP(mean): 105 (12 Aug 2022 22:00) (74 - 116)  RR: 18 (13 Aug 2022 04:00) (18 - 32)  SpO2: 97% (12 Aug 2022 22:00) (96% - 100%)    O2 Parameters below as of 12 Aug 2022 22:00  Patient On (Oxygen Delivery Method): room air      LABS:                        11.5   12.02 )-----------( 295      ( 13 Aug 2022 05:57 )             35.9     08-13    141  |  107  |  16  ----------------------------<  104<H>  4.2   |  24  |  0.6<L>    Ca    9.2      13 Aug 2022 05:57  Phos  3.6     08-13  Mg     1.9     08-13    TPro  6.0  /  Alb  3.4<L>  /  TBili  0.7  /  DBili  x   /  AST  27  /  ALT  24  /  AlkPhos  137<H>  08-13    PTT - ( 12 Aug 2022 05:15 )  PTT:44.8 sec      RADIOLOGY:    < from: Xray Chest 1 View- PORTABLE-Urgent (Xray Chest 1 View- PORTABLE-Urgent .) (08.10.22 @ 17:25) >    ACC: [id number] EXAM:  XR CHEST PORTABLE URGENT 1V                          PROCEDURE DATE:  08/10/2022      INTERPRETATION:  Clinical History/Reason for Exam:  cp    Impression:    No consolidation, effusion or pneumothorax.    --- End of Report ---        PHYSICAL EXAM:  GEN:  LUNGS:   HEART:   ABD:   EXT:   NEURO:

## 2022-08-14 ENCOUNTER — TRANSCRIPTION ENCOUNTER (OUTPATIENT)
Age: 82
End: 2022-08-14

## 2022-08-14 VITALS
SYSTOLIC BLOOD PRESSURE: 135 MMHG | HEART RATE: 84 BPM | DIASTOLIC BLOOD PRESSURE: 76 MMHG | RESPIRATION RATE: 18 BRPM | TEMPERATURE: 97 F

## 2022-08-14 LAB
ANION GAP SERPL CALC-SCNC: 11 MMOL/L — SIGNIFICANT CHANGE UP (ref 7–14)
BASOPHILS # BLD AUTO: 0.1 K/UL — SIGNIFICANT CHANGE UP (ref 0–0.2)
BASOPHILS NFR BLD AUTO: 0.9 % — SIGNIFICANT CHANGE UP (ref 0–1)
BUN SERPL-MCNC: 17 MG/DL — SIGNIFICANT CHANGE UP (ref 10–20)
CALCIUM SERPL-MCNC: 9.1 MG/DL — SIGNIFICANT CHANGE UP (ref 8.5–10.1)
CHLORIDE SERPL-SCNC: 103 MMOL/L — SIGNIFICANT CHANGE UP (ref 98–110)
CO2 SERPL-SCNC: 25 MMOL/L — SIGNIFICANT CHANGE UP (ref 17–32)
CREAT SERPL-MCNC: 0.7 MG/DL — SIGNIFICANT CHANGE UP (ref 0.7–1.5)
EGFR: 86 ML/MIN/1.73M2 — SIGNIFICANT CHANGE UP
EOSINOPHIL # BLD AUTO: 0.75 K/UL — HIGH (ref 0–0.7)
EOSINOPHIL NFR BLD AUTO: 6.4 % — SIGNIFICANT CHANGE UP (ref 0–8)
GLUCOSE SERPL-MCNC: 104 MG/DL — HIGH (ref 70–99)
HCT VFR BLD CALC: 33.5 % — LOW (ref 37–47)
HGB BLD-MCNC: 11 G/DL — LOW (ref 12–16)
IMM GRANULOCYTES NFR BLD AUTO: 0.3 % — SIGNIFICANT CHANGE UP (ref 0.1–0.3)
LYMPHOCYTES # BLD AUTO: 1.9 K/UL — SIGNIFICANT CHANGE UP (ref 1.2–3.4)
LYMPHOCYTES # BLD AUTO: 16.3 % — LOW (ref 20.5–51.1)
MAGNESIUM SERPL-MCNC: 1.9 MG/DL — SIGNIFICANT CHANGE UP (ref 1.8–2.4)
MCHC RBC-ENTMCNC: 28.5 PG — SIGNIFICANT CHANGE UP (ref 27–31)
MCHC RBC-ENTMCNC: 32.8 G/DL — SIGNIFICANT CHANGE UP (ref 32–37)
MCV RBC AUTO: 86.8 FL — SIGNIFICANT CHANGE UP (ref 81–99)
MONOCYTES # BLD AUTO: 1.36 K/UL — HIGH (ref 0.1–0.6)
MONOCYTES NFR BLD AUTO: 11.7 % — HIGH (ref 1.7–9.3)
NEUTROPHILS # BLD AUTO: 7.51 K/UL — HIGH (ref 1.4–6.5)
NEUTROPHILS NFR BLD AUTO: 64.4 % — SIGNIFICANT CHANGE UP (ref 42.2–75.2)
NRBC # BLD: 0 /100 WBCS — SIGNIFICANT CHANGE UP (ref 0–0)
PLATELET # BLD AUTO: 286 K/UL — SIGNIFICANT CHANGE UP (ref 130–400)
POTASSIUM SERPL-MCNC: 4.1 MMOL/L — SIGNIFICANT CHANGE UP (ref 3.5–5)
POTASSIUM SERPL-SCNC: 4.1 MMOL/L — SIGNIFICANT CHANGE UP (ref 3.5–5)
RBC # BLD: 3.86 M/UL — LOW (ref 4.2–5.4)
RBC # FLD: 13.1 % — SIGNIFICANT CHANGE UP (ref 11.5–14.5)
SODIUM SERPL-SCNC: 139 MMOL/L — SIGNIFICANT CHANGE UP (ref 135–146)
WBC # BLD: 11.66 K/UL — HIGH (ref 4.8–10.8)
WBC # FLD AUTO: 11.66 K/UL — HIGH (ref 4.8–10.8)

## 2022-08-14 PROCEDURE — 99232 SBSQ HOSP IP/OBS MODERATE 35: CPT

## 2022-08-14 RX ORDER — ATORVASTATIN CALCIUM 80 MG/1
1 TABLET, FILM COATED ORAL
Qty: 0 | Refills: 0 | DISCHARGE

## 2022-08-14 RX ORDER — TICAGRELOR 90 MG/1
1 TABLET ORAL
Qty: 90 | Refills: 0
Start: 2022-08-14 | End: 2022-09-27

## 2022-08-14 RX ORDER — ASPIRIN/CALCIUM CARB/MAGNESIUM 324 MG
1 TABLET ORAL
Qty: 45 | Refills: 0
Start: 2022-08-14 | End: 2022-09-27

## 2022-08-14 RX ORDER — AMLODIPINE BESYLATE AND BENAZEPRIL HYDROCHLORIDE 10; 20 MG/1; MG/1
1 CAPSULE ORAL
Qty: 0 | Refills: 0 | DISCHARGE

## 2022-08-14 RX ORDER — METOPROLOL TARTRATE 50 MG
1 TABLET ORAL
Qty: 30 | Refills: 0
Start: 2022-08-14 | End: 2022-09-12

## 2022-08-14 RX ORDER — ATORVASTATIN CALCIUM 80 MG/1
1 TABLET, FILM COATED ORAL
Qty: 45 | Refills: 0
Start: 2022-08-14 | End: 2022-09-27

## 2022-08-14 RX ADMIN — Medication 81 MILLIGRAM(S): at 11:40

## 2022-08-14 RX ADMIN — CHLORHEXIDINE GLUCONATE 1 APPLICATION(S): 213 SOLUTION TOPICAL at 05:54

## 2022-08-14 RX ADMIN — Medication 100 MICROGRAM(S): at 05:53

## 2022-08-14 RX ADMIN — TICAGRELOR 90 MILLIGRAM(S): 90 TABLET ORAL at 05:53

## 2022-08-14 RX ADMIN — Medication 12.5 MILLIGRAM(S): at 05:55

## 2022-08-14 NOTE — DISCHARGE NOTE NURSING/CASE MANAGEMENT/SOCIAL WORK - PATIENT PORTAL LINK FT
You can access the FollowMyHealth Patient Portal offered by [facility] by registering at the following website: [email or web address]. By joining [facility]’s FollowMyHealth portal, you will also be able to view your health information using other applications (apps) compatible with our system.

## 2022-08-14 NOTE — PROGRESS NOTE ADULT - ASSESSMENT
82F PMHx HTN, DLD, CAD and hypothyridism presented for chest pain, found to have STEMI s/p RPL balloon angioplasty. Admitted to CCU pending staged procedure on Friday 8/12/22.    #STEMI  #2vCAD s/p PCI s/p ISAAC   - Inferior STEMI on EKG  - Cath showed 2 vessel CAD with an RPDL 100% occlusion s/p balloon angioplasty  - Staged CATH done yesterday w/ ISAAC placement  - ECHO EF 35% with apical WMA  - c/w DAPT, ASA/Brilinta  - c/w lipitor 80 mg QHS  - Increased Metoprolol to 25mg BID as per cardio  - hold all B-blocker and AV node blocking agents  - A1c 6.1%, TSH 1.12, Lipid Profile ()  - F/U EKG - 8/13 LAD, RBB, inferior and al infarct  - F/U Trop - 1.03    #Delirium  - F/U UA for possible cause    #Dyslipidemia  - C/w lipitor 80mg qHS    #HTN  - c/w metoprolol 25mg BID    #Hypothyroidism  - C/w levothyroxine 100mcg qD      Misc:  DVT Prophylaxis: Lovenox 40mg qD  GI Prophylaxis: pantoprazole 40mg   Diet: DASH  Activity: IAT  Code Status: Full  Dispo: monitor in 3C Tele, can d/c tomorrow if medically cleared (cleared by cardio)   82F PMHx HTN, DLD, CAD and hypothyridism presented for chest pain, found to have STEMI s/p RPL balloon angioplasty. Admitted to CCU pending staged procedure on Friday 8/12/22.    #STEMI    #2vCAD s/p PCI s/p ISAAC   - Inferior STEMI on EKG  - Cath showed 2 vessel CAD with an RPDL 100% occlusion s/p balloon angioplasty  - Staged CATH done yesterday w/ ISAAC placement  - ECHO EF 35% with apical WMA  - c/w DAPT, ASA/Brilinta  - c/w lipitor 80 mg QHS  - Increased Metoprolol to 25mg BID as per cardio  - hold all B-blocker and AV node blocking agents  - A1c 6.1%, TSH 1.12, Lipid Profile ()  - F/U EKG - 8/13 LAD, RBB, inferior and al infarct  - F/U Trop - 1.03    #Delirium  - F/U UA for possible cause    #Dyslipidemia  - C/w lipitor 80mg qHS    #HTN  - c/w metoprolol 25mg BID    #Hypothyroidism  - C/w levothyroxine 100mcg qD      Misc:  DVT Prophylaxis: Lovenox 40mg qD  GI Prophylaxis: pantoprazole 40mg   Diet: DASH  Activity: IAT  Code Status: Full  Dispo: d/c home today

## 2022-08-14 NOTE — PROGRESS NOTE ADULT - SUBJECTIVE AND OBJECTIVE BOX
BILL LUGO 82y Female  MRN#: [id number]   CODE STATUS:________    Hospital Day: 4d    Pt is currently admitted with the primary diagnosis of     SUBJECTIVE  Hospital Course    Overnight events   Nose bleed last night.     Subjective complaints   Patient doing well. Overnight had couple of episodes of nose bleed. No other complaints.   Present Today:   - Randolph:  No [  ], Yes [   ] : Indication:     - Type of IV Access:       .. CVC/Piccline:  No [  ], Yes [   ] : Indication:       .. Midline: No [  ], Yes [   ] : Indication:                                             ----------------------------------------------------------  OBJECTIVE  PAST MEDICAL & SURGICAL HISTORY  Hypertension    Dyslipidemia    CAD (coronary artery disease)                                              -----------------------------------------------------------  ALLERGIES:  No Known Allergies                                            ------------------------------------------------------------    HOME MEDICATIONS  Home Medications:  amlodipine-benazepril 5 mg-40 mg oral capsule: 1 cap(s) orally once a day (10 Aug 2022 18:12)  atorvastatin 40 mg oral tablet: 1 tab(s) orally once a day (10 Aug 2022 18:12)  levothyroxine 100 mcg (0.1 mg) oral tablet: 1 tab(s) orally once a day (10 Aug 2022 18:12)                           MEDICATIONS:  STANDING MEDICATIONS  aspirin  chewable 81 milliGRAM(s) Chew daily  atorvastatin 80 milliGRAM(s) Oral at bedtime  chlorhexidine 2% Cloths 1 Application(s) Topical <User Schedule>  levothyroxine 100 MICROGram(s) Oral daily  metoprolol tartrate 12.5 milliGRAM(s) Oral every 12 hours  sodium chloride 0.9%. 1000 milliLiter(s) IV Continuous <Continuous>  sodium chloride 0.9%. 1000 milliLiter(s) IV Continuous <Continuous>  ticagrelor 90 milliGRAM(s) Oral every 12 hours    PRN MEDICATIONS                                            ------------------------------------------------------------  VITAL SIGNS: Last 24 Hours  T(C): 36.1 (14 Aug 2022 05:00), Max: 36.2 (13 Aug 2022 14:38)  T(F): 97 (14 Aug 2022 05:00), Max: 97.1 (13 Aug 2022 14:38)  HR: 77 (14 Aug 2022 05:00) (77 - 97)  BP: 130/70 (14 Aug 2022 05:00) (119/58 - 158/77)  BP(mean): --  RR: 18 (14 Aug 2022 05:00) (18 - 18)  SpO2: --      08-13-22 @ 07:01  -  08-14-22 @ 07:00  --------------------------------------------------------  IN: 480 mL / OUT: 700 mL / NET: -220 mL                                             --------------------------------------------------------------  LABS:                        11.5   12.02 )-----------( 295      ( 13 Aug 2022 05:57 )             35.9     08-13    141  |  107  |  16  ----------------------------<  104<H>  4.2   |  24  |  0.6<L>    Ca    9.2      13 Aug 2022 05:57  Phos  3.6     08-13  Mg     1.9     08-13    TPro  6.0  /  Alb  3.4<L>  /  TBili  0.7  /  DBili  x   /  AST  27  /  ALT  24  /  AlkPhos  137<H>  08-13          Troponin T, Serum: 1.03 ng/mL *HH* (08-13-22 @ 11:44)          CARDIAC MARKERS ( 13 Aug 2022 11:44 )  x     / 1.03 ng/mL / x     / x     / 4.8 ng/mL                                              -------------------------------------------------------------  RADIOLOGY:                                            --------------------------------------------------------------    PHYSICAL EXAM:  CONSTITUTIONAL: Well-developed; well-nourished; in no acute distress  SKIN: warm, dry  HEAD: Normocephalic; atraumatic  EYES: PERRL.  ENT: No nasal discharge, airway clear, mucous membranes moist  NECK: Supple; non tender.  CARD: +S1, +S2, no murmurs, gallops, or rubs. (Regular) rate and rhythm    RESP: No wheezes, rales or rhonchi. CTA B/L  ABD: soft ntnd, + BS x 4 quadrants  EXT: moves all extremities,  no clubbing, cyanosis or edema  NEURO: Alert and oriented x3, no focal deficits  PSYCH: Cooperative, appropriate  VASCULAR:  + Rad / + PTs / + DPs  EXTREMITY: Right Groin:  Dressing removed, access site soft, + pulses, no hematoma, no pain, no numbness, no signs and symptoms of infection  	   Right Radial: Dressing removed, access site soft, + pulses, no hematoma, no pain, no numbness, no signs and symptoms of infection             BILL LUGO 82y Female  MRN#: [id number]   CODE STATUS:________    Hospital Day: 4d      Pt is currently admitted with the primary diagnosis of     SUBJECTIVE  Hospital Course    Overnight events   Nose bleed last night.     Subjective complaints   Patient doing well. Overnight had couple of episodes of nose bleed. No other complaints.   Present Today:   - Randolph:  No [  ], Yes [   ] : Indication:     - Type of IV Access:       .. CVC/Piccline:  No [  ], Yes [   ] : Indication:       .. Midline: No [  ], Yes [   ] : Indication:                                             ----------------------------------------------------------  OBJECTIVE  PAST MEDICAL & SURGICAL HISTORY  Hypertension    Dyslipidemia    CAD (coronary artery disease)                                              -----------------------------------------------------------  ALLERGIES:  No Known Allergies                                            ------------------------------------------------------------    HOME MEDICATIONS  Home Medications:  amlodipine-benazepril 5 mg-40 mg oral capsule: 1 cap(s) orally once a day (10 Aug 2022 18:12)  atorvastatin 40 mg oral tablet: 1 tab(s) orally once a day (10 Aug 2022 18:12)  levothyroxine 100 mcg (0.1 mg) oral tablet: 1 tab(s) orally once a day (10 Aug 2022 18:12)                           MEDICATIONS:  STANDING MEDICATIONS  aspirin  chewable 81 milliGRAM(s) Chew daily  atorvastatin 80 milliGRAM(s) Oral at bedtime  chlorhexidine 2% Cloths 1 Application(s) Topical <User Schedule>  levothyroxine 100 MICROGram(s) Oral daily  metoprolol tartrate 12.5 milliGRAM(s) Oral every 12 hours  sodium chloride 0.9%. 1000 milliLiter(s) IV Continuous <Continuous>  sodium chloride 0.9%. 1000 milliLiter(s) IV Continuous <Continuous>  ticagrelor 90 milliGRAM(s) Oral every 12 hours    PRN MEDICATIONS                                            ------------------------------------------------------------  VITAL SIGNS: Last 24 Hours  T(C): 36.1 (14 Aug 2022 05:00), Max: 36.2 (13 Aug 2022 14:38)  T(F): 97 (14 Aug 2022 05:00), Max: 97.1 (13 Aug 2022 14:38)  HR: 77 (14 Aug 2022 05:00) (77 - 97)  BP: 130/70 (14 Aug 2022 05:00) (119/58 - 158/77)  BP(mean): --  RR: 18 (14 Aug 2022 05:00) (18 - 18)  SpO2: --      08-13-22 @ 07:01  -  08-14-22 @ 07:00  --------------------------------------------------------  IN: 480 mL / OUT: 700 mL / NET: -220 mL                                             --------------------------------------------------------------  LABS:                        11.5   12.02 )-----------( 295      ( 13 Aug 2022 05:57 )             35.9     08-13    141  |  107  |  16  ----------------------------<  104<H>  4.2   |  24  |  0.6<L>    Ca    9.2      13 Aug 2022 05:57  Phos  3.6     08-13  Mg     1.9     08-13    TPro  6.0  /  Alb  3.4<L>  /  TBili  0.7  /  DBili  x   /  AST  27  /  ALT  24  /  AlkPhos  137<H>  08-13          Troponin T, Serum: 1.03 ng/mL *HH* (08-13-22 @ 11:44)          CARDIAC MARKERS ( 13 Aug 2022 11:44 )  x     / 1.03 ng/mL / x     / x     / 4.8 ng/mL                                              -------------------------------------------------------------  RADIOLOGY:                                            --------------------------------------------------------------    PHYSICAL EXAM:  CONSTITUTIONAL: Well-developed; well-nourished; in no acute distress  SKIN: warm, dry  HEAD: Normocephalic; atraumatic  EYES: PERRL.  ENT: No nasal discharge, airway clear, mucous membranes moist  NECK: Supple; non tender.  CARD: +S1, +S2, no murmurs, gallops, or rubs. (Regular) rate and rhythm    RESP: No wheezes, rales or rhonchi. CTA B/L  ABD: soft ntnd, + BS x 4 quadrants  EXT: moves all extremities,  no clubbing, cyanosis or edema  NEURO: Alert and oriented x3, no focal deficits  PSYCH: Cooperative, appropriate  VASCULAR:  + Rad / + PTs / + DPs  EXTREMITY: Right Groin:  Dressing removed, access site soft, + pulses, no hematoma, no pain, no numbness, no signs and symptoms of infection  	   Right Radial: Dressing removed, access site soft, + pulses, no hematoma, no pain, no numbness, no signs and symptoms of infection

## 2022-08-19 DIAGNOSIS — I25.118 ATHEROSCLEROTIC HEART DISEASE OF NATIVE CORONARY ARTERY WITH OTHER FORMS OF ANGINA PECTORIS: ICD-10-CM

## 2022-08-19 DIAGNOSIS — I45.2 BIFASCICULAR BLOCK: ICD-10-CM

## 2022-08-19 DIAGNOSIS — F05 DELIRIUM DUE TO KNOWN PHYSIOLOGICAL CONDITION: ICD-10-CM

## 2022-08-19 DIAGNOSIS — I21.02 ST ELEVATION (STEMI) MYOCARDIAL INFARCTION INVOLVING LEFT ANTERIOR DESCENDING CORONARY ARTERY: ICD-10-CM

## 2022-08-19 DIAGNOSIS — I50.20 UNSPECIFIED SYSTOLIC (CONGESTIVE) HEART FAILURE: ICD-10-CM

## 2022-08-19 DIAGNOSIS — E03.9 HYPOTHYROIDISM, UNSPECIFIED: ICD-10-CM

## 2022-08-19 DIAGNOSIS — I11.0 HYPERTENSIVE HEART DISEASE WITH HEART FAILURE: ICD-10-CM

## 2022-08-19 DIAGNOSIS — I70.0 ATHEROSCLEROSIS OF AORTA: ICD-10-CM

## 2022-08-19 DIAGNOSIS — I47.2 VENTRICULAR TACHYCARDIA: ICD-10-CM

## 2022-08-19 DIAGNOSIS — R04.0 EPISTAXIS: ICD-10-CM

## 2022-08-19 DIAGNOSIS — Z79.82 LONG TERM (CURRENT) USE OF ASPIRIN: ICD-10-CM

## 2022-08-19 DIAGNOSIS — E78.5 HYPERLIPIDEMIA, UNSPECIFIED: ICD-10-CM

## 2022-08-19 DIAGNOSIS — Z98.61 CORONARY ANGIOPLASTY STATUS: ICD-10-CM

## 2022-08-19 DIAGNOSIS — R11.2 NAUSEA WITH VOMITING, UNSPECIFIED: ICD-10-CM

## 2023-11-21 PROBLEM — I25.10 ATHEROSCLEROTIC HEART DISEASE OF NATIVE CORONARY ARTERY WITHOUT ANGINA PECTORIS: Chronic | Status: ACTIVE | Noted: 2022-08-10

## 2023-11-21 PROBLEM — I10 ESSENTIAL (PRIMARY) HYPERTENSION: Chronic | Status: ACTIVE | Noted: 2022-08-10

## 2023-11-21 PROBLEM — E78.5 HYPERLIPIDEMIA, UNSPECIFIED: Chronic | Status: ACTIVE | Noted: 2022-08-10

## 2023-12-19 ENCOUNTER — APPOINTMENT (OUTPATIENT)
Dept: PLASTIC SURGERY | Facility: CLINIC | Age: 83
End: 2023-12-19
Payer: MEDICARE

## 2023-12-19 VITALS — BODY MASS INDEX: 29.39 KG/M2 | HEIGHT: 58 IN | WEIGHT: 140 LBS

## 2023-12-19 DIAGNOSIS — Z78.9 OTHER SPECIFIED HEALTH STATUS: ICD-10-CM

## 2023-12-19 PROCEDURE — 99203 OFFICE O/P NEW LOW 30 MIN: CPT

## 2023-12-19 RX ORDER — ATORVASTATIN CALCIUM 10 MG/1
10 TABLET, FILM COATED ORAL
Refills: 0 | Status: ACTIVE | COMMUNITY

## 2023-12-19 RX ORDER — AMLODIPINE BESYLATE 10 MG/1
10 TABLET ORAL
Refills: 0 | Status: ACTIVE | COMMUNITY

## 2023-12-19 RX ORDER — LEVOTHYROXINE SODIUM 0.17 MG/1
TABLET ORAL
Refills: 0 | Status: ACTIVE | COMMUNITY

## 2023-12-19 RX ORDER — METOPROLOL TARTRATE 75 MG/1
TABLET, FILM COATED ORAL
Refills: 0 | Status: ACTIVE | COMMUNITY

## 2023-12-19 RX ORDER — NEOMYCIN/BACITRACIN/POLYMYXINB 3.5MG-4
OINTMENT (GRAM) OPHTHALMIC (EYE)
Refills: 0 | Status: ACTIVE | COMMUNITY

## 2023-12-19 NOTE — PHYSICAL EXAM
[de-identified] : Well developed, well nourished in NAD  [de-identified] : Atraumatic, normocephalic  [de-identified] : Left eye with  [de-identified] : Normal ears, normal nose and normal lips  [de-identified] : Supple [de-identified] : Non labored on RA

## 2023-12-19 NOTE — HISTORY OF PRESENT ILLNESS
[FreeTextEntry1] : 84 y/o female with h/o HTN, hypothyroid, anxiety, osteoparosis and MI (Aug 2022, 2x stents, stopped AC, on daily ASA) who presents for evaluation of left eye ectropion. Pt states that this started approx 1 year ago, unsure how it started, and has been becoming progressively worse. Used eye OTC drops however did not provide relief. [facility] Sees Dr. Kevin Jj (opthomology)  Dr. Souza (cardio)  No h/o DVT/PE No h/o DM Quit smoking 17 years ago Here with nephew, pt does not drive

## 2023-12-19 NOTE — ASSESSMENT
[FreeTextEntry1] : seen w her nephew since Nov 2022 left lowerlid acquired ectropion  has seen one optho (Kourtney) for many eyars was referred to see plastic surgeon--did not see because of insurance reasons  on exam, very poor lower lid laxity on left   Photos taken with patient permission.  ?etiology ectropion  would require lower lid procedure  Photos taken with patient permission.  Discussed possibility of possible ca dx for lower lid  will send frozen section to r/o malignancy   discussed if cancer dx would involve more surgery  Regarding the procedure, we discussed scarring, poor wound healing, bleeding, infection, need for additional surgery, and dissatisfaction with the outcome.  Also discussed possibility of keloid and/or hypertrophic scar formation as well as recurrence.  All questions were answered and risks understood.

## 2024-01-12 ENCOUNTER — OUTPATIENT (OUTPATIENT)
Dept: OUTPATIENT SERVICES | Facility: HOSPITAL | Age: 84
LOS: 1 days | End: 2024-01-12
Payer: MEDICARE

## 2024-01-12 VITALS
TEMPERATURE: 97 F | OXYGEN SATURATION: 97 % | DIASTOLIC BLOOD PRESSURE: 80 MMHG | HEIGHT: 58 IN | RESPIRATION RATE: 16 BRPM | WEIGHT: 145.06 LBS | SYSTOLIC BLOOD PRESSURE: 172 MMHG | HEART RATE: 66 BPM

## 2024-01-12 DIAGNOSIS — Z01.818 ENCOUNTER FOR OTHER PREPROCEDURAL EXAMINATION: ICD-10-CM

## 2024-01-12 DIAGNOSIS — Z98.890 OTHER SPECIFIED POSTPROCEDURAL STATES: Chronic | ICD-10-CM

## 2024-01-12 DIAGNOSIS — Z95.5 PRESENCE OF CORONARY ANGIOPLASTY IMPLANT AND GRAFT: Chronic | ICD-10-CM

## 2024-01-12 DIAGNOSIS — Z90.49 ACQUIRED ABSENCE OF OTHER SPECIFIED PARTS OF DIGESTIVE TRACT: Chronic | ICD-10-CM

## 2024-01-12 DIAGNOSIS — H02.126: ICD-10-CM

## 2024-01-12 LAB
ALBUMIN SERPL ELPH-MCNC: 4.7 G/DL — SIGNIFICANT CHANGE UP (ref 3.5–5.2)
ALBUMIN SERPL ELPH-MCNC: 4.7 G/DL — SIGNIFICANT CHANGE UP (ref 3.5–5.2)
ALP SERPL-CCNC: 114 U/L — SIGNIFICANT CHANGE UP (ref 30–115)
ALP SERPL-CCNC: 114 U/L — SIGNIFICANT CHANGE UP (ref 30–115)
ALT FLD-CCNC: 20 U/L — SIGNIFICANT CHANGE UP (ref 0–41)
ALT FLD-CCNC: 20 U/L — SIGNIFICANT CHANGE UP (ref 0–41)
ANION GAP SERPL CALC-SCNC: 13 MMOL/L — SIGNIFICANT CHANGE UP (ref 7–14)
ANION GAP SERPL CALC-SCNC: 13 MMOL/L — SIGNIFICANT CHANGE UP (ref 7–14)
AST SERPL-CCNC: 24 U/L — SIGNIFICANT CHANGE UP (ref 0–41)
AST SERPL-CCNC: 24 U/L — SIGNIFICANT CHANGE UP (ref 0–41)
BASOPHILS # BLD AUTO: 0.11 K/UL — SIGNIFICANT CHANGE UP (ref 0–0.2)
BASOPHILS # BLD AUTO: 0.11 K/UL — SIGNIFICANT CHANGE UP (ref 0–0.2)
BASOPHILS NFR BLD AUTO: 0.9 % — SIGNIFICANT CHANGE UP (ref 0–1)
BASOPHILS NFR BLD AUTO: 0.9 % — SIGNIFICANT CHANGE UP (ref 0–1)
BILIRUB SERPL-MCNC: 0.3 MG/DL — SIGNIFICANT CHANGE UP (ref 0.2–1.2)
BILIRUB SERPL-MCNC: 0.3 MG/DL — SIGNIFICANT CHANGE UP (ref 0.2–1.2)
BUN SERPL-MCNC: 19 MG/DL — SIGNIFICANT CHANGE UP (ref 10–20)
BUN SERPL-MCNC: 19 MG/DL — SIGNIFICANT CHANGE UP (ref 10–20)
CALCIUM SERPL-MCNC: 9.5 MG/DL — SIGNIFICANT CHANGE UP (ref 8.4–10.5)
CALCIUM SERPL-MCNC: 9.5 MG/DL — SIGNIFICANT CHANGE UP (ref 8.4–10.5)
CHLORIDE SERPL-SCNC: 102 MMOL/L — SIGNIFICANT CHANGE UP (ref 98–110)
CHLORIDE SERPL-SCNC: 102 MMOL/L — SIGNIFICANT CHANGE UP (ref 98–110)
CO2 SERPL-SCNC: 29 MMOL/L — SIGNIFICANT CHANGE UP (ref 17–32)
CO2 SERPL-SCNC: 29 MMOL/L — SIGNIFICANT CHANGE UP (ref 17–32)
CREAT SERPL-MCNC: 0.7 MG/DL — SIGNIFICANT CHANGE UP (ref 0.7–1.5)
CREAT SERPL-MCNC: 0.7 MG/DL — SIGNIFICANT CHANGE UP (ref 0.7–1.5)
EGFR: 86 ML/MIN/1.73M2 — SIGNIFICANT CHANGE UP
EGFR: 86 ML/MIN/1.73M2 — SIGNIFICANT CHANGE UP
EOSINOPHIL # BLD AUTO: 0.56 K/UL — SIGNIFICANT CHANGE UP (ref 0–0.7)
EOSINOPHIL # BLD AUTO: 0.56 K/UL — SIGNIFICANT CHANGE UP (ref 0–0.7)
EOSINOPHIL NFR BLD AUTO: 4.4 % — SIGNIFICANT CHANGE UP (ref 0–8)
EOSINOPHIL NFR BLD AUTO: 4.4 % — SIGNIFICANT CHANGE UP (ref 0–8)
GLUCOSE SERPL-MCNC: 71 MG/DL — SIGNIFICANT CHANGE UP (ref 70–99)
GLUCOSE SERPL-MCNC: 71 MG/DL — SIGNIFICANT CHANGE UP (ref 70–99)
HCT VFR BLD CALC: 44.9 % — SIGNIFICANT CHANGE UP (ref 37–47)
HCT VFR BLD CALC: 44.9 % — SIGNIFICANT CHANGE UP (ref 37–47)
HGB BLD-MCNC: 14 G/DL — SIGNIFICANT CHANGE UP (ref 12–16)
HGB BLD-MCNC: 14 G/DL — SIGNIFICANT CHANGE UP (ref 12–16)
IMM GRANULOCYTES NFR BLD AUTO: 0.3 % — SIGNIFICANT CHANGE UP (ref 0.1–0.3)
IMM GRANULOCYTES NFR BLD AUTO: 0.3 % — SIGNIFICANT CHANGE UP (ref 0.1–0.3)
LYMPHOCYTES # BLD AUTO: 2.71 K/UL — SIGNIFICANT CHANGE UP (ref 1.2–3.4)
LYMPHOCYTES # BLD AUTO: 2.71 K/UL — SIGNIFICANT CHANGE UP (ref 1.2–3.4)
LYMPHOCYTES # BLD AUTO: 21.4 % — SIGNIFICANT CHANGE UP (ref 20.5–51.1)
LYMPHOCYTES # BLD AUTO: 21.4 % — SIGNIFICANT CHANGE UP (ref 20.5–51.1)
MCHC RBC-ENTMCNC: 28 PG — SIGNIFICANT CHANGE UP (ref 27–31)
MCHC RBC-ENTMCNC: 28 PG — SIGNIFICANT CHANGE UP (ref 27–31)
MCHC RBC-ENTMCNC: 31.2 G/DL — LOW (ref 32–37)
MCHC RBC-ENTMCNC: 31.2 G/DL — LOW (ref 32–37)
MCV RBC AUTO: 89.8 FL — SIGNIFICANT CHANGE UP (ref 81–99)
MCV RBC AUTO: 89.8 FL — SIGNIFICANT CHANGE UP (ref 81–99)
MONOCYTES # BLD AUTO: 1.19 K/UL — HIGH (ref 0.1–0.6)
MONOCYTES # BLD AUTO: 1.19 K/UL — HIGH (ref 0.1–0.6)
MONOCYTES NFR BLD AUTO: 9.4 % — HIGH (ref 1.7–9.3)
MONOCYTES NFR BLD AUTO: 9.4 % — HIGH (ref 1.7–9.3)
NEUTROPHILS # BLD AUTO: 8.05 K/UL — HIGH (ref 1.4–6.5)
NEUTROPHILS # BLD AUTO: 8.05 K/UL — HIGH (ref 1.4–6.5)
NEUTROPHILS NFR BLD AUTO: 63.6 % — SIGNIFICANT CHANGE UP (ref 42.2–75.2)
NEUTROPHILS NFR BLD AUTO: 63.6 % — SIGNIFICANT CHANGE UP (ref 42.2–75.2)
NRBC # BLD: 0 /100 WBCS — SIGNIFICANT CHANGE UP (ref 0–0)
NRBC # BLD: 0 /100 WBCS — SIGNIFICANT CHANGE UP (ref 0–0)
PLATELET # BLD AUTO: 286 K/UL — SIGNIFICANT CHANGE UP (ref 130–400)
PLATELET # BLD AUTO: 286 K/UL — SIGNIFICANT CHANGE UP (ref 130–400)
PMV BLD: 12 FL — HIGH (ref 7.4–10.4)
PMV BLD: 12 FL — HIGH (ref 7.4–10.4)
POTASSIUM SERPL-MCNC: 4 MMOL/L — SIGNIFICANT CHANGE UP (ref 3.5–5)
POTASSIUM SERPL-MCNC: 4 MMOL/L — SIGNIFICANT CHANGE UP (ref 3.5–5)
POTASSIUM SERPL-SCNC: 4 MMOL/L — SIGNIFICANT CHANGE UP (ref 3.5–5)
POTASSIUM SERPL-SCNC: 4 MMOL/L — SIGNIFICANT CHANGE UP (ref 3.5–5)
PROT SERPL-MCNC: 7.5 G/DL — SIGNIFICANT CHANGE UP (ref 6–8)
PROT SERPL-MCNC: 7.5 G/DL — SIGNIFICANT CHANGE UP (ref 6–8)
RBC # BLD: 5 M/UL — SIGNIFICANT CHANGE UP (ref 4.2–5.4)
RBC # BLD: 5 M/UL — SIGNIFICANT CHANGE UP (ref 4.2–5.4)
RBC # FLD: 12.6 % — SIGNIFICANT CHANGE UP (ref 11.5–14.5)
RBC # FLD: 12.6 % — SIGNIFICANT CHANGE UP (ref 11.5–14.5)
SODIUM SERPL-SCNC: 144 MMOL/L — SIGNIFICANT CHANGE UP (ref 135–146)
SODIUM SERPL-SCNC: 144 MMOL/L — SIGNIFICANT CHANGE UP (ref 135–146)
WBC # BLD: 12.66 K/UL — HIGH (ref 4.8–10.8)
WBC # BLD: 12.66 K/UL — HIGH (ref 4.8–10.8)
WBC # FLD AUTO: 12.66 K/UL — HIGH (ref 4.8–10.8)
WBC # FLD AUTO: 12.66 K/UL — HIGH (ref 4.8–10.8)

## 2024-01-12 PROCEDURE — 85025 COMPLETE CBC W/AUTO DIFF WBC: CPT

## 2024-01-12 PROCEDURE — 93010 ELECTROCARDIOGRAM REPORT: CPT

## 2024-01-12 PROCEDURE — 93005 ELECTROCARDIOGRAM TRACING: CPT

## 2024-01-12 PROCEDURE — 36415 COLL VENOUS BLD VENIPUNCTURE: CPT

## 2024-01-12 PROCEDURE — 99214 OFFICE O/P EST MOD 30 MIN: CPT | Mod: 25

## 2024-01-12 PROCEDURE — 80053 COMPREHEN METABOLIC PANEL: CPT

## 2024-01-12 RX ORDER — ALPRAZOLAM 0.25 MG
0 TABLET ORAL
Qty: 0 | Refills: 0 | DISCHARGE

## 2024-01-12 NOTE — H&P PST ADULT - NSICDXPASTMEDICALHX_GEN_ALL_CORE_FT
PAST MEDICAL HISTORY:  Anxiety     CAD (coronary artery disease)     Dyslipidemia     Glaucoma     H/O osteoporosis     H/O psoriasis     Hypertension

## 2024-01-12 NOTE — H&P PST ADULT - NSSUBSTANCEUSE_GEN_ALL_CORE_SD
Please cancel Rx that he has plenty of at the pharmacy.  
Reason for Call:  Other call back    Detailed comments: Akosua RN, at [facility] called clinic. She is asking to speak with Dr. Browne or his nurse regarding this patient's medications. She didn't go into detail - she would prefer to discuss it with Pavan. Please give Akosua a call back.     Phone Number: [phone]    Best Time: any    Can we leave a detailed message on this number? YES    Call taken on 5/17/2019 at 10:46 AM by Mahamed Solomon    
Spoke to Fara at pharmacy, cancelled remaining scripts of medication mentioned below. Closing encounter.  
Spoke with Akosua at [facility] [place]. Patient currently has  Hydroxyzine x 585 tablets and Dutasteride x 210 capsulesin his possession.  They are requesting that we cancel the scripts at the pharmacy so wife can no longer pick these up as he has more than a 90 day supply on hand.      Will flag for DJ.  Do you agree to cancel remaining scripts of the above?    Eduardo Tyler, RN, BSN         
never used

## 2024-01-12 NOTE — H&P PST ADULT - NSICDXPASTSURGICALHX_GEN_ALL_CORE_FT
PAST SURGICAL HISTORY:  H/O removal of cyst     History of cholecystectomy     S/P coronary artery stent placement

## 2024-01-12 NOTE — H&P PST ADULT - GASTROINTESTINAL
normal/soft/nontender/nondistended/normal active bowel sounds Purse String (Intermediate) Text: Given the location of the defect and the characteristics of the surrounding skin a pursestring intermediate closure was deemed most appropriate.  Undermining was performed circumfirentially around the surgical defect.  A purstring suture was then placed and tightened.

## 2024-01-12 NOTE — H&P PST ADULT - HISTORY OF PRESENT ILLNESS
Patient is a 83 year old  female presenting to PAST in preparation for EXCISION OF LEFT MEDIAL LOWER EYELID, SURGICAL REPAIR OF LEFT ECTROPION  on 1/24  under general anesthesia by Dr. Vizcaino.  pt has left eye ectropion. Pt states that this started approx 1 year ago, unsure how it started, and has been becoming progressively worse. Used eye OTC drops(refresh)however did not provide relief. Pt denies any issues with [employer] .    PATIENT CURRENTLY DENIES CHEST PAIN  SHORTNESS OF BREATH  PALPITATIONS,  DYSURIA, OR UPPER RESPIRATORY INFECTION IN PAST 2 WEEKS  pt is extremely anxious.    denies travel outside the [place] in the past 30 days  Patient denies any signs or symptoms of COVID 19    Anesthesia Alert  NO--Difficult Airway  NO--History of neck surgery or radiation  NO--Limited ROM of neck  NO--History of Malignant hyperthermia  NO--No personal or family history of Pseudocholinesterase deficiency.  NO--Prior Anesthesia Complication  NO--Latex Allergy  NO--Loose teeth  NO--History of Rheumatoid Arthritis  NO--Bleeding risk  NO--GINO  NO--Other_____    PT DENIES ANY RASHES, ABRASION, OR OPEN WOUNDS OR CUTS    AS PER THE PT, THIS IS HIS/HER COMPLETE MEDICAL AND SURGICAL HX, INCLUDING MEDICATIONS PRESCRIBED AND OVER THE COUNTER    Patient verbalized understanding of instructions and was given the opportunity to ask questions and have them answered.    pt denies any suicidal ideation or thoughts, pt states not a threat to self or others    Revised Cardiac Risk Index for Pre-Operative Risk from [employer]  on 1/12/2024  ** All calculations should be rechecked by clinician prior to use **    RESULT SUMMARY:  1 points  Class II Risk    6.0 %  30-day risk of death, MI, or cardiac arrest    From Duceppe 2017. These numbers are higher than those from the original study (Chace 1999). See Evidence for details.    INPUTS:  Elevated-risk surgery —> 0 = No  History of ischemic heart disease —> 1 = Yes  History of congestive heart failure —> 0 = No  History of cerebrovascular disease —> 0 = No  Pre-operative treatment with insulin —> 0 = No  Pre-operative creatinine >2 mg/dL / 176.8 µmol/L —> 0 = No    Duke Activity Status Index (DASI) from [employer]  on 1/12/2024  ** All calculations should be rechecked by clinician prior to use **    RESULT SUMMARY:  50.7 points  The higher the score (maximum 58.2), the higher the functional status.    8.97 METs      INPUTS:  Take care of self —> 2.75 = Yes  Walk indoors —> 1.75 = Yes  Walk 1&ndash;2 blocks on level ground —> 2.75 = Yes  Climb a flight of stairs or walk up a hill —> 5.5 = Yes  Run a short distance —> 8 = Yes  Do light work around the house —> 2.7 = Yes  Do moderate work around the house —> 3.5 = Yes  Do heavy work around the house —> 8 = Yes  Do yardwork —> 4.5 = Yes  Have sexual relations —> 5.25 = Yes  Participate in moderate recreational activities —> 6 = Yes  Participate in strenuous sports —> 0 = No       Patient is a 83 year old  female presenting to PAST in preparation for EXCISION OF LEFT MEDIAL LOWER EYELID, SURGICAL REPAIR OF LEFT ECTROPION  on 1/24  under general anesthesia by Dr. Vizcaino.  pt has left eye ectropion. Pt states that this started approx 1 year ago, unsure how it started, and has been becoming progressively worse. Used eye OTC drops(refresh)however did not provide relief. Pt denies any issues with Visual Acuity .    PATIENT CURRENTLY DENIES CHEST PAIN  SHORTNESS OF BREATH  PALPITATIONS,  DYSURIA, OR UPPER RESPIRATORY INFECTION IN PAST 2 WEEKS  pt is extremely anxious.    denies travel outside the [place] in the past 30 days  Patient denies any signs or symptoms of COVID 19    Anesthesia Alert  NO--Difficult Airway  NO--History of neck surgery or radiation  NO--Limited ROM of neck  NO--History of Malignant hyperthermia  NO--No personal or family history of Pseudocholinesterase deficiency.  NO--Prior Anesthesia Complication  NO--Latex Allergy  NO--Loose teeth  NO--History of Rheumatoid Arthritis  NO--Bleeding risk  NO--GINO  NO--Other_____    PT DENIES ANY RASHES, ABRASION, OR OPEN WOUNDS OR CUTS    AS PER THE PT, THIS IS HIS/HER COMPLETE MEDICAL AND SURGICAL HX, INCLUDING MEDICATIONS PRESCRIBED AND OVER THE COUNTER    Patient verbalized understanding of instructions and was given the opportunity to ask questions and have them answered.    pt denies any suicidal ideation or thoughts, pt states not a threat to self or others    Revised Cardiac Risk Index for Pre-Operative Risk from [employer]  on 1/12/2024  ** All calculations should be rechecked by clinician prior to use **    RESULT SUMMARY:  1 points  Class II Risk    6.0 %  30-day risk of death, MI, or cardiac arrest    From Duceppe 2017. These numbers are higher than those from the original study (Chace 1999). See Evidence for details.    INPUTS:  Elevated-risk surgery —> 0 = No  History of ischemic heart disease —> 1 = Yes  History of congestive heart failure —> 0 = No  History of cerebrovascular disease —> 0 = No  Pre-operative treatment with insulin —> 0 = No  Pre-operative creatinine >2 mg/dL / 176.8 µmol/L —> 0 = No    Duke Activity Status Index (DASI) from [employer]  on 1/12/2024  ** All calculations should be rechecked by clinician prior to use **    RESULT SUMMARY:  50.7 points  The higher the score (maximum 58.2), the higher the functional status.    8.97 METs      INPUTS:  Take care of self —> 2.75 = Yes  Walk indoors —> 1.75 = Yes  Walk 1&ndash;2 blocks on level ground —> 2.75 = Yes  Climb a flight of stairs or walk up a hill —> 5.5 = Yes  Run a short distance —> 8 = Yes  Do light work around the house —> 2.7 = Yes  Do moderate work around the house —> 3.5 = Yes  Do heavy work around the house —> 8 = Yes  Do yardwork —> 4.5 = Yes  Have sexual relations —> 5.25 = Yes  Participate in moderate recreational activities —> 6 = Yes  Participate in strenuous sports —> 0 = No       Patient is a 83 year old  female presenting to PAST in preparation for EXCISION OF LEFT MEDIAL LOWER EYELID, SURGICAL REPAIR OF LEFT ECTROPION  on 1/24  under general anesthesia by Dr. Vizcaino.  pt has left eye ectropion. Pt states that this started approx 1 year ago, unsure how it started, and has been becoming progressively worse. Used eye OTC drops(refresh)however did not provide relief. Pt denies any issues with Visual Acuity .    PATIENT CURRENTLY DENIES CHEST PAIN  SHORTNESS OF BREATH  PALPITATIONS,  DYSURIA, OR UPPER RESPIRATORY INFECTION IN PAST 2 WEEKS  pt is extremely anxious.    denies travel outside the [place] in the past 30 days  Patient denies any signs or symptoms of COVID 19    Anesthesia Alert  NO--Difficult Airway  NO--History of neck surgery or radiation  NO--Limited ROM of neck  NO--History of Malignant hyperthermia  NO--No personal or family history of Pseudocholinesterase deficiency.  NO--Prior Anesthesia Complication  NO--Latex Allergy  NO--Loose teeth  NO--History of Rheumatoid Arthritis  NO--Bleeding risk  NO--GINO  NO--Other_____    PT DENIES ANY RASHES, ABRASION, OR OPEN WOUNDS OR CUTS    AS PER THE PT, THIS IS HIS/HER COMPLETE MEDICAL AND SURGICAL HX, INCLUDING MEDICATIONS PRESCRIBED AND OVER THE COUNTER    Patient verbalized understanding of instructions and was given the opportunity to ask questions and have them answered.    pt denies any suicidal ideation or thoughts, pt states not a threat to self or others    Revised Cardiac Risk Index for Pre-Operative Risk from [employer]  on 1/12/2024  ** All calculations should be rechecked by clinician prior to use **    RESULT SUMMARY:  1 points  Class II Risk    6.0 %  30-day risk of death, MI, or cardiac arrest    From Duceppe 2017. These numbers are higher than those from the original study (Chace 1999). See Evidence for details.    INPUTS:  Elevated-risk surgery —> 0 = No  History of ischemic heart disease —> 1 = Yes  History of congestive heart failure —> 0 = No  History of cerebrovascular disease —> 0 = No  Pre-operative treatment with insulin —> 0 = No  Pre-operative creatinine >2 mg/dL / 176.8 µmol/L —> 0 = No    Duke Activity Status Index (DASI) from [employer]  on 1/12/2024  ** All calculations should be rechecked by clinician prior to use **    RESULT SUMMARY:  50.7 points  The higher the score (maximum 58.2), the higher the functional status.    8.97 METs      INPUTS:  Take care of self —> 2.75 = Yes  Walk indoors —> 1.75 = Yes  Walk 1&ndash;2 blocks on level ground —> 2.75 = Yes  Climb a flight of stairs or walk up a hill —> 5.5 = Yes  Run a short distance —> 8 = Yes  Do light work around the house —> 2.7 = Yes  Do moderate work around the house —> 3.5 = Yes  Do heavy work around the house —> 8 = Yes  Do yardwork —> 4.5 = Yes  Have sexual relations —> 5.25 = Yes  Participate in moderate recreational activities —> 6 = Yes  Participate in strenuous sports —> 0 = No

## 2024-01-13 DIAGNOSIS — Z01.818 ENCOUNTER FOR OTHER PREPROCEDURAL EXAMINATION: ICD-10-CM

## 2024-01-13 DIAGNOSIS — H02.126: ICD-10-CM

## 2024-01-24 ENCOUNTER — TRANSCRIPTION ENCOUNTER (OUTPATIENT)
Age: 84
End: 2024-01-24

## 2024-01-24 ENCOUNTER — OUTPATIENT (OUTPATIENT)
Dept: OUTPATIENT SERVICES | Facility: HOSPITAL | Age: 84
LOS: 1 days | Discharge: HOME | End: 2024-01-24
Payer: MEDICARE

## 2024-01-24 ENCOUNTER — APPOINTMENT (OUTPATIENT)
Dept: PLASTIC SURGERY | Facility: HOSPITAL | Age: 84
End: 2024-01-24
Payer: MEDICARE

## 2024-01-24 ENCOUNTER — RESULT REVIEW (OUTPATIENT)
Age: 84
End: 2024-01-24

## 2024-01-24 VITALS
DIASTOLIC BLOOD PRESSURE: 79 MMHG | RESPIRATION RATE: 16 BRPM | WEIGHT: 145.06 LBS | SYSTOLIC BLOOD PRESSURE: 164 MMHG | OXYGEN SATURATION: 96 % | HEIGHT: 58 IN | TEMPERATURE: 98 F | HEART RATE: 79 BPM

## 2024-01-24 VITALS
HEART RATE: 82 BPM | OXYGEN SATURATION: 96 % | DIASTOLIC BLOOD PRESSURE: 74 MMHG | SYSTOLIC BLOOD PRESSURE: 165 MMHG | RESPIRATION RATE: 18 BRPM

## 2024-01-24 DIAGNOSIS — E66.9 OBESITY, UNSPECIFIED: ICD-10-CM

## 2024-01-24 DIAGNOSIS — I10 ESSENTIAL (PRIMARY) HYPERTENSION: ICD-10-CM

## 2024-01-24 DIAGNOSIS — E78.5 HYPERLIPIDEMIA, UNSPECIFIED: ICD-10-CM

## 2024-01-24 DIAGNOSIS — Z95.5 PRESENCE OF CORONARY ANGIOPLASTY IMPLANT AND GRAFT: ICD-10-CM

## 2024-01-24 DIAGNOSIS — Z98.890 OTHER SPECIFIED POSTPROCEDURAL STATES: Chronic | ICD-10-CM

## 2024-01-24 DIAGNOSIS — M81.0 AGE-RELATED OSTEOPOROSIS WITHOUT CURRENT PATHOLOGICAL FRACTURE: ICD-10-CM

## 2024-01-24 DIAGNOSIS — F41.9 ANXIETY DISORDER, UNSPECIFIED: ICD-10-CM

## 2024-01-24 DIAGNOSIS — Z87.891 PERSONAL HISTORY OF NICOTINE DEPENDENCE: ICD-10-CM

## 2024-01-24 DIAGNOSIS — H02.105 UNSPECIFIED ECTROPION OF LEFT LOWER EYELID: ICD-10-CM

## 2024-01-24 DIAGNOSIS — Z95.5 PRESENCE OF CORONARY ANGIOPLASTY IMPLANT AND GRAFT: Chronic | ICD-10-CM

## 2024-01-24 DIAGNOSIS — Z90.49 ACQUIRED ABSENCE OF OTHER SPECIFIED PARTS OF DIGESTIVE TRACT: Chronic | ICD-10-CM

## 2024-01-24 DIAGNOSIS — H02.126: ICD-10-CM

## 2024-01-24 DIAGNOSIS — I25.10 ATHEROSCLEROTIC HEART DISEASE OF NATIVE CORONARY ARTERY WITHOUT ANGINA PECTORIS: ICD-10-CM

## 2024-01-24 DIAGNOSIS — Z79.82 LONG TERM (CURRENT) USE OF ASPIRIN: ICD-10-CM

## 2024-01-24 PROCEDURE — 88304 TISSUE EXAM BY PATHOLOGIST: CPT | Mod: 26

## 2024-01-24 PROCEDURE — 88304 TISSUE EXAM BY PATHOLOGIST: CPT

## 2024-01-24 PROCEDURE — 67950 REVISION OF EYELID: CPT | Mod: LT

## 2024-01-24 PROCEDURE — 14060 TIS TRNFR E/N/E/L 10 SQ CM/<: CPT

## 2024-01-24 RX ORDER — ACETAMINOPHEN 500 MG
650 TABLET ORAL ONCE
Refills: 0 | Status: COMPLETED | OUTPATIENT
Start: 2024-01-24 | End: 2024-01-24

## 2024-01-24 RX ORDER — OXYCODONE HYDROCHLORIDE 5 MG/1
5 TABLET ORAL ONCE
Refills: 0 | Status: DISCONTINUED | OUTPATIENT
Start: 2024-01-24 | End: 2024-01-24

## 2024-01-24 RX ORDER — AMLODIPINE BESYLATE 2.5 MG/1
1 TABLET ORAL
Refills: 0 | DISCHARGE

## 2024-01-24 RX ORDER — SODIUM CHLORIDE 9 MG/ML
1000 INJECTION, SOLUTION INTRAVENOUS
Refills: 0 | Status: DISCONTINUED | OUTPATIENT
Start: 2024-01-24 | End: 2024-01-24

## 2024-01-24 RX ORDER — ONDANSETRON 8 MG/1
4 TABLET, FILM COATED ORAL ONCE
Refills: 0 | Status: DISCONTINUED | OUTPATIENT
Start: 2024-01-24 | End: 2024-01-24

## 2024-01-24 RX ORDER — CHOLECALCIFEROL (VITAMIN D3) 125 MCG
1 CAPSULE ORAL
Refills: 0 | DISCHARGE

## 2024-01-24 RX ORDER — LATANOPROST 0.05 MG/ML
1 SOLUTION/ DROPS OPHTHALMIC; TOPICAL
Refills: 0 | DISCHARGE

## 2024-01-24 RX ORDER — METOCLOPRAMIDE HCL 10 MG
10 TABLET ORAL ONCE
Refills: 0 | Status: DISCONTINUED | OUTPATIENT
Start: 2024-01-24 | End: 2024-01-24

## 2024-01-24 RX ORDER — ALPRAZOLAM 0.25 MG
1 TABLET ORAL
Refills: 0 | DISCHARGE

## 2024-01-24 RX ORDER — LEVOTHYROXINE SODIUM 125 MCG
1 TABLET ORAL
Qty: 0 | Refills: 0 | DISCHARGE

## 2024-01-24 RX ADMIN — OXYCODONE HYDROCHLORIDE 5 MILLIGRAM(S): 5 TABLET ORAL at 12:12

## 2024-01-24 RX ADMIN — Medication 650 MILLIGRAM(S): at 11:48

## 2024-01-24 NOTE — ASU DISCHARGE PLAN (ADULT/PEDIATRIC) - CARE PROVIDER_API CALL
Clovis Vizcaino  Plastic Surgery  [address] [place] [id number]  Phone: [phone]  Fax: [phone]  Established Patient  Follow Up Time: 1 week

## 2024-01-24 NOTE — PRE-ANESTHESIA EVALUATION ADULT - NSRADCARDRESULTSFT_GEN_ALL_CORE
EKG:    Ventricular Rate 57 BPM    Atrial Rate 57 BPM    P-R Interval 216 ms    QRS Duration 132 ms    Q-T Interval 476 ms    QTC Calculation(Bazett) 463 ms    P Axis 58 degrees    R Axis 15 degrees    T Axis 26 degrees    Diagnosis Line Sinus bradycardia with 1st degree A-V block  Right bundle branch block  Septal infarct , age undetermined  Abnormal ECG    Confirmed by NAZANIN TALBERT, BHASKAR ([id number]) on 1/12/2024 11:00:26 PM

## 2024-01-24 NOTE — ASU DISCHARGE PLAN (ADULT/PEDIATRIC) - ASU DC SPECIAL INSTRUCTIONSFT
Please follow up with Dr. Vizcaino. Please call his office at the number provided to schedule this appointment. Please call within 48 hours of leaving the hospital.     Pain meds:   - Extra strength tylenol routinely. Please do not exceed 4,000mg in one day.   - Ok to take NSAIDS as needed (Advil, mortin, Ibuprofen)    Sleep with your head elevated    ICE YOUR FACE OFF AND ON THROUGHOUT THE DAY     BACITRACIN TO INCISION TWICE DAILY

## 2024-01-24 NOTE — BRIEF OPERATIVE NOTE - OPERATION/FINDINGS
L lower lid ectropion repair with skin re-suspension, conjunctival inversion, and lateral canthopexy

## 2024-01-31 ENCOUNTER — APPOINTMENT (OUTPATIENT)
Dept: PLASTIC SURGERY | Facility: CLINIC | Age: 84
End: 2024-01-31
Payer: MEDICARE

## 2024-01-31 DIAGNOSIS — H02.109 UNSPECIFIED ECTROPION OF UNSPECIFIED EYE, UNSPECIFIED EYELID: ICD-10-CM

## 2024-01-31 PROBLEM — F41.9 ANXIETY DISORDER, UNSPECIFIED: Chronic | Status: ACTIVE | Noted: 2024-01-12

## 2024-01-31 PROBLEM — H40.9 UNSPECIFIED GLAUCOMA: Chronic | Status: ACTIVE | Noted: 2024-01-12

## 2024-01-31 PROBLEM — Z87.2 PERSONAL HISTORY OF DISEASES OF THE SKIN AND SUBCUTANEOUS TISSUE: Chronic | Status: ACTIVE | Noted: 2024-01-12

## 2024-01-31 PROBLEM — Z87.39 PERSONAL HISTORY OF OTHER DISEASES OF THE MUSCULOSKELETAL SYSTEM AND CONNECTIVE TISSUE: Chronic | Status: ACTIVE | Noted: 2024-01-12

## 2024-01-31 LAB — SURGICAL PATHOLOGY STUDY: SIGNIFICANT CHANGE UP

## 2024-01-31 PROCEDURE — 99024 POSTOP FOLLOW-UP VISIT: CPT

## 2024-01-31 NOTE — ASSESSMENT
[FreeTextEntry1] : 82 y/o female with h/o HTN, hypothyroid, anxiety, osteoparosis and MI (Aug 2022, 2x stents, stopped AC, on daily ASA) who presents for evaluation of left eye ectropion. Pt has seen one optho (Kourtney) for many eyars   Pt is POD# 7 s/p L lower lid ectropion repair with skin re-suspension, conjunctival inversion, and lateral canthopexy Doing very well.   - All sutures dissolvable  - Ice intermittently - Ok to continue opthalmic ointments, aquaphor to lateral canthal incision line - No path back - Ok to shower / gently wash face - Ok to continue daily ADLs - s/s of infection reviewed - All questions answered - f/u 2 weeks with

## 2024-01-31 NOTE — HISTORY OF PRESENT ILLNESS
[FreeTextEntry1] : 82 y/o female with h/o HTN, hypothyroid, anxiety, osteoparosis and MI (Aug 2022, 2x stents, stopped AC, on daily ASA) who presents for evaluation of left eye ectropion. Pt states that this started approx 1 year ago, unsure how it started, and has been becoming progressively worse. Used eye OTC drops however did not provide relief. [facility] issues [facility] Sees Dr. Kevin Jj (opthomology)  Dr. Souza (cardio)  No h/o DVT/PE No h/o DM Quit smoking 17 years ago Here with nephew, pt does not drive   Interval hx (1/31/24): Pt is POD# 7 s/p L lower lid ectropion repair with skin re-suspension, conjunctival inversion, and lateral canthopexy. Pt is doing very well. Denies significant pain. Denies f/c/drainage. No abnl tearing. Has been using opthalmic baci daily along with left over eye lubricant that was given to her at the hospital.

## 2024-01-31 NOTE — PHYSICAL EXAM
[de-identified] : Well developed, well nourished in NAD  [de-identified] : Atraumatic, normocephalic  [de-identified] : B/l EOMIs with b/l injected conjuntiva, L > R.  Left eye lower lid ectropion now suspended to lateral canthus with complete improvement, no visible lower lid conjunctiva. With garfield orbital ecchymosis and swelling as expected. 2x chromic tails on tear troughs in place. Lateral canthal incision CDI, no open areas, drainage or signs of infection [de-identified] : Normal ears, normal nose and normal lips  [de-identified] : Supple [de-identified] : Non labored on RA

## 2024-02-15 ENCOUNTER — APPOINTMENT (OUTPATIENT)
Dept: PLASTIC SURGERY | Facility: CLINIC | Age: 84
End: 2024-02-15
Payer: MEDICARE

## 2024-02-15 PROCEDURE — 99024 POSTOP FOLLOW-UP VISIT: CPT

## 2024-02-15 NOTE — PHYSICAL EXAM
[de-identified] : . Left eye lower lid ectropion now suspended to lateral canthus with complete improvement, no visible lower lid conjunctiva. With garfield orbital ecchymosis and swelling as expected.  Lateral canthal incision CDI, no open areas, drainage or signs of infectio

## 2024-02-15 NOTE — ASSESSMENT
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 Pt has seen one optho ([facility]) for many eyars   Pt is PO s/p L lower lid ectropion repair with skin re-suspension, conjunctival inversion, and lateral canthopexy on 1/24/31. Doing very well.  - All sutures dissolvable - Ice intermittently - Ok to continue opthalmic ointments, aquaphor to lateral canthal incision line - ok to resume gluacoma drops   path--benign  doing well left eye looks good--ectropion has (thusfar) been treated well  pt happy--"it is 100% better"  resume normal eyedrops  f/u 3-4 months  Photos taken with patient permission. derm eval for psoriasis

## 2024-02-15 NOTE — HISTORY OF PRESENT ILLNESS
[FreeTextEntry1] : 82 y/o female with h/o HTN, hypothyroid, anxiety, osteoparosis and MI (Aug 2022, 2x stents, stopped AC, on daily ASA) who presents for evaluation of left eye ectropion. Pt states that this started approx 1 year ago, unsure how it started, and has been becoming progressively worse. Used eye OTC drops however did not provide relief. Pt denies any issues with [facility]. Sees Dr. Kevin Jj (opthomology)  Dr. Souza (cardio)  No h/o DVT/PE No h/o DM Quit smoking 17 years ago Here with nephew, pt does not drive  Interval hx (1/31/24): Pt is POD# 7 s/p L lower lid ectropion repair with skin re-suspension, conjunctival inversion, and lateral canthopexy. Pt is doing very well. Denies significant pain. Denies f/c/drainage. No abnl tearing. Has been using opthalmic baci daily along with left over eye lubricant that was given to her at the hospital.   interval 2/15/24: doing well. tearing decreasing, no draiange from wound. using opthlamic baci

## 2024-05-21 ENCOUNTER — APPOINTMENT (OUTPATIENT)
Dept: PLASTIC SURGERY | Facility: CLINIC | Age: 84
End: 2024-05-21
Payer: MEDICARE

## 2024-05-21 PROCEDURE — 99212 OFFICE O/P EST SF 10 MIN: CPT

## 2024-05-21 NOTE — PHYSICAL EXAM
[de-identified] : . Left eye lower lid ectropion now suspended to lateral canthus with complete improvement, no visible lower lid conjunctiva. With garfield orbital ecchymosis and swelling as expected.  Lateral canthal incision CDI, no open areas, drainage or signs of infectio

## 2024-05-21 NOTE — HISTORY OF PRESENT ILLNESS
[FreeTextEntry1] : 84 y/o female with h/o HTN, hypothyroid, anxiety, osteoparosis and MI (Aug 2022, 2x stents, stopped AC, on daily ASA) who presents for evaluation of left eye ectropion. Pt states that this started approx 1 year ago, unsure how it started, and has been becoming progressively worse. Used eye OTC drops however did not provide relief. Pt denies any issues with [facility]. Sees Dr. Kevin Jj (opthomology)  Dr. Souza (cardio)  No h/o DVT/PE No h/o DM Quit smoking 17 years ago Here with nephew, pt does not drive  Interval hx (1/31/24): Pt is POD# 7 s/p L lower lid ectropion repair with skin re-suspension, conjunctival inversion, and lateral canthopexy. Pt is doing very well. Denies significant pain. Denies f/c/drainage. No abnl tearing. Has been using opthalmic baci daily along with left over eye lubricant that was given to her at the hospital.   interval 2/15/24: doing well. tearing decreasing, no draiange from wound. using opthlamic baci

## 2024-05-21 NOTE — ASSESSMENT
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 Pt has seen one optho ([facility]) for many eyars   Pt is PO s/p L lower lid ectropion repair with skin re-suspension, conjunctival inversion, and lateral canthopexy on 1/24/31. Doing very well.  - All sutures dissolvable - Ice intermittently - Ok to continue opthalmic ointments, aquaphor to lateral canthal incision line - ok to resume gluacoma drops   path--benign  doing well left eye looks good--ectropion has (thusfar) been treated well  pt happy--"it is 100% better"  resume normal eyedrops  f/u 3-4 months  Photos taken with patient permission. derm eval for psoriasis  5/21/2024 left lower eyelid fine after repair ectropion pt very happy overall  to have cataract surgery f/u in fall after cataract surgery  massage

## 2024-11-18 ENCOUNTER — OUTPATIENT (OUTPATIENT)
Dept: OUTPATIENT SERVICES | Facility: HOSPITAL | Age: 84
LOS: 1 days | End: 2024-11-18
Payer: MEDICARE

## 2024-11-18 DIAGNOSIS — Z90.49 ACQUIRED ABSENCE OF OTHER SPECIFIED PARTS OF DIGESTIVE TRACT: Chronic | ICD-10-CM

## 2024-11-18 DIAGNOSIS — R07.9 CHEST PAIN, UNSPECIFIED: ICD-10-CM

## 2024-11-18 DIAGNOSIS — Z98.890 OTHER SPECIFIED POSTPROCEDURAL STATES: Chronic | ICD-10-CM

## 2024-11-18 DIAGNOSIS — Z00.8 ENCOUNTER FOR OTHER GENERAL EXAMINATION: ICD-10-CM

## 2024-11-18 DIAGNOSIS — Z95.5 PRESENCE OF CORONARY ANGIOPLASTY IMPLANT AND GRAFT: Chronic | ICD-10-CM

## 2024-11-18 PROCEDURE — 75574 CT ANGIO HRT W/3D IMAGE: CPT

## 2024-11-18 PROCEDURE — 75574 CT ANGIO HRT W/3D IMAGE: CPT | Mod: 26

## 2024-11-19 DIAGNOSIS — R07.9 CHEST PAIN, UNSPECIFIED: ICD-10-CM

## 2024-11-21 ENCOUNTER — APPOINTMENT (OUTPATIENT)
Dept: PLASTIC SURGERY | Facility: CLINIC | Age: 84
End: 2024-11-21
Payer: MEDICARE

## 2024-11-21 PROCEDURE — 99212 OFFICE O/P EST SF 10 MIN: CPT

## 2024-11-21 PROCEDURE — G2211 COMPLEX E/M VISIT ADD ON: CPT

## 2025-01-23 ENCOUNTER — APPOINTMENT (OUTPATIENT)
Dept: PLASTIC SURGERY | Facility: CLINIC | Age: 85
End: 2025-01-23

## 2025-02-06 ENCOUNTER — APPOINTMENT (OUTPATIENT)
Dept: PLASTIC SURGERY | Facility: CLINIC | Age: 85
End: 2025-02-06
Payer: MEDICARE

## 2025-02-06 PROCEDURE — G2211 COMPLEX E/M VISIT ADD ON: CPT

## 2025-02-06 PROCEDURE — 99212 OFFICE O/P EST SF 10 MIN: CPT
